# Patient Record
Sex: MALE | Race: WHITE | NOT HISPANIC OR LATINO | Employment: OTHER | URBAN - METROPOLITAN AREA
[De-identification: names, ages, dates, MRNs, and addresses within clinical notes are randomized per-mention and may not be internally consistent; named-entity substitution may affect disease eponyms.]

---

## 2017-06-13 ENCOUNTER — OFFICE VISIT (OUTPATIENT)
Dept: OPTOMETRY | Facility: CLINIC | Age: 67
End: 2017-06-13
Payer: COMMERCIAL

## 2017-06-13 DIAGNOSIS — H52.203 ASTIGMATISM, BILATERAL: ICD-10-CM

## 2017-06-13 DIAGNOSIS — H52.03 HYPEROPIA, BILATERAL: Primary | ICD-10-CM

## 2017-06-13 DIAGNOSIS — H52.4 PRESBYOPIA: ICD-10-CM

## 2017-06-13 DIAGNOSIS — H26.9 CATARACTS, BOTH EYES: ICD-10-CM

## 2017-06-13 PROCEDURE — 92014 COMPRE OPH EXAM EST PT 1/>: CPT | Performed by: OPTOMETRIST

## 2017-06-13 PROCEDURE — 92015 DETERMINE REFRACTIVE STATE: CPT | Performed by: OPTOMETRIST

## 2017-06-13 ASSESSMENT — VISUAL ACUITY
METHOD: SNELLEN - LINEAR
OD_SC: 20/100
OS_SC: 20/60
OS_CC: 20/30
OS_CC+: -2
OD_CC: 20/80
OS_CC: 20/40
OD_CC: 20/80-1
CORRECTION_TYPE: GLASSES
OD_CC+: -1
OD_PH_SC: 20/40
OS_SC+: -1

## 2017-06-13 ASSESSMENT — TONOMETRY
OD_IOP_MMHG: 17
OS_IOP_MMHG: 14
IOP_METHOD: TONOPEN

## 2017-06-13 ASSESSMENT — REFRACTION_MANIFEST
OS_AXIS: 005
OD_SPHERE: +1.00
OD_CYLINDER: +0.75
OD_ADD: +2.75
OS_SPHERE: +1.25
OS_ADD: +2.75
OD_AXIS: 180
OS_CYLINDER: +0.75

## 2017-06-13 ASSESSMENT — REFRACTION_WEARINGRX
OD_AXIS: 015
OS_ADD: +2.75
OS_SPHERE: +1.00
OD_AXIS: 015
OD_CYLINDER: +0.50
OS_ADD: +2.75
OD_CYLINDER: +0.50
OD_SPHERE: +1.00
OS_ADD: +2.75
OD_ADD: +2.75
OD_SPHERE: +1.00
OD_ADD: +2.75
OD_CYLINDER: +1.00
OS_AXIS: 005
SPECS_TYPE: PAL
OD_AXIS: 015
OS_SPHERE: +1.25
OS_ADD: +2.75
OS_CYLINDER: +0.75
OS_AXIS: 005
OS_CYLINDER: +0.75
OS_CYLINDER: +0.75
OS_AXIS: 005
OD_ADD: +2.75
OS_AXIS: 015
OS_SPHERE: +1.00
OD_CYLINDER: +0.50
OS_SPHERE: +1.25
OD_SPHERE: +1.00
OD_AXIS: 015
OD_ADD: +2.75
OD_SPHERE: +1.25
OS_CYLINDER: +0.75

## 2017-06-13 ASSESSMENT — SLIT LAMP EXAM - LIDS
COMMENTS: 1+ DERMATOCHALASIS - UPPER LID
COMMENTS: 1+ DERMATOCHALASIS - UPPER LID

## 2017-06-13 ASSESSMENT — CONF VISUAL FIELD
OD_NORMAL: 1
METHOD: COUNTING FINGERS
OS_NORMAL: 1

## 2017-06-13 ASSESSMENT — EXTERNAL EXAM - LEFT EYE: OS_EXAM: NORMAL

## 2017-06-13 ASSESSMENT — CUP TO DISC RATIO
OS_RATIO: 0.15
OD_RATIO: 0.15

## 2017-06-13 ASSESSMENT — EXTERNAL EXAM - RIGHT EYE: OD_EXAM: NORMAL

## 2017-06-13 NOTE — MR AVS SNAPSHOT
"              After Visit Summary   6/13/2017    Jhonatan Crocker    MRN: [id number]           Patient Information     Date Of Birth          1950        Visit Information        Provider Department      6/13/2017 9:40 AM Crow Huerta, OD [facility]        Today's Diagnoses     Hyperopia, bilateral    -  1    Astigmatism, bilateral        Presbyopia        Cataracts, both eyes          Care Instructions    Eyeglass prescription given.    You have cataracts which are affecting your vision.  A change in glasses will not give you much improvement.  A cataract evaluation can be scheduled with the eye surgeon to discuss with you the option of cataract surgery.  (patient declines)- wants to monitor.     Return in 1 year for a complete eye exam or sooner if needed.    Crow Huerta OD            Follow-ups after your visit        Follow-up notes from your care team     Return in about 1 year (around 6/13/2018) for Annual Visit.      Who to contact     If you have questions or need follow up information about today's clinic visit or your schedule please contact [facility] directly at [phone].  Normal or non-critical lab and imaging results will be communicated to you by Aracahart, letter or phone within 4 business days after the clinic has received the results. If you do not hear from us within 7 days, please contact the clinic through Kitara Mediat or phone. If you have a critical or abnormal lab result, we will notify you by phone as soon as possible.  Submit refill requests through CaseTrek or call your pharmacy and they will forward the refill request to us. Please allow 3 business days for your refill to be completed.          Additional Information About Your Visit        Aracahart Information     CaseTrek lets you send messages to your doctor, view your test results, renew your prescriptions, schedule appointments and more. To sign up, go to [email or web address] . Click on \"Log " "in\" on the left side of the screen, which will take you to the Welcome page. Then click on \"Sign up Now\" on the right side of the page.     You will be asked to enter the access code listed below, as well as some personal information. Please follow the directions to create your username and password.     Your access code is: [email or web address]  Expires: 2017 11:04 AM     Your access code will  in 90 days. If you need help or a new code, please call your [facility] or [phone].        Care EveryWhere ID     This is your Care EveryWhere ID. This could be used by other organizations to access your [employer] medical records  [id number]         Blood Pressure from Last 3 Encounters:   No data found for BP    Weight from Last 3 Encounters:   No data found for Wt              We Performed the Following     EYE EXAM (SIMPLE-NONBILLABLE)     REFRACTION        Primary Care Provider Office Phone # Fax #    Randy Allred [phone]       [address] [address]        Thank you!     Thank you for choosing [facility]  for your care. Our goal is always to provide you with excellent care. Hearing back from our patients is one way we can continue to improve our services. Please take a few minutes to complete the written survey that you may receive in the mail after your visit with us. Thank you!             Your Updated Medication List - Protect others around you: Learn how to safely use, store and throw away your medicines at www.disposemymeds.org.          This list is accurate as of: 17 11:04 AM.  Always use your most recent med list.                   Brand Name Dispense Instructions for use    aspirin 325 MG tablet      Take by mouth daily       ICAPS MV PO            "

## 2017-06-13 NOTE — PROGRESS NOTES
Chief Complaint   Patient presents with     COMPREHENSIVE EYE EXAM      Accompanied by wife  Last Eye Exam: 5-  Dilated Previously: Yes    What are you currently using to see?  glasses       Distance Vision Acuity: Satisfied with vision    Near Vision Acuity: Satisfied with vision while reading  with glasses    Eye Comfort: good  Do you use eye drops? : No  Occupation or Hobbies: retired    Cherie Mederos Optometric Assistant, A.B.O.C.          Medical, surgical and family histories reviewed and updated 6/13/2017.       OBJECTIVE: See Ophthalmology exam    ASSESSMENT:    ICD-10-CM    1. Hyperopia, bilateral H52.03 REFRACTION   2. Astigmatism, bilateral H52.203 REFRACTION   3. Presbyopia H52.4 REFRACTION   4. Cataracts, both eyes H26.9 EYE EXAM (SIMPLE-NONBILLABLE)      PLAN:     Patient Instructions   Eyeglass prescription given.    You have cataracts which are affecting your vision.  A change in glasses will not give you much improvement.  A cataract evaluation can be scheduled with the eye surgeon to discuss with you the option of cataract surgery.  (patient declines)- wants to monitor.     Return in 1 year for a complete eye exam or sooner if needed.    Crow Huerta, OD

## 2017-06-13 NOTE — PATIENT INSTRUCTIONS
Eyeglass prescription given.    You have cataracts which are affecting your vision.  A change in glasses will not give you much improvement.  A cataract evaluation can be scheduled with the eye surgeon to discuss with you the option of cataract surgery.  (patient declines)- wants to monitor.     Return in 1 year for a complete eye exam or sooner if needed.    Crow Huerta, OD

## 2018-06-20 ENCOUNTER — OFFICE VISIT (OUTPATIENT)
Dept: OPTOMETRY | Facility: CLINIC | Age: 68
End: 2018-06-20
Payer: COMMERCIAL

## 2018-06-20 DIAGNOSIS — H25.813 COMBINED FORMS OF AGE-RELATED CATARACT OF BOTH EYES: ICD-10-CM

## 2018-06-20 DIAGNOSIS — H52.4 PRESBYOPIA: Primary | ICD-10-CM

## 2018-06-20 DIAGNOSIS — H52.223 REGULAR ASTIGMATISM OF BOTH EYES: ICD-10-CM

## 2018-06-20 DIAGNOSIS — H52.03 HYPERMETROPIA OF BOTH EYES: ICD-10-CM

## 2018-06-20 PROCEDURE — 92014 COMPRE OPH EXAM EST PT 1/>: CPT | Performed by: OPTOMETRIST

## 2018-06-20 PROCEDURE — 92015 DETERMINE REFRACTIVE STATE: CPT | Performed by: OPTOMETRIST

## 2018-06-20 ASSESSMENT — REFRACTION_WEARINGRX
OS_AXIS: 015
OD_SPHERE: +1.00
OS_SPHERE: +1.00
SPECS_TYPE: AUTO/SVL
OD_AXIS: 014
OD_CYLINDER: +0.50
OD_CYLINDER: +0.75
OS_CYLINDER: +0.50
OS_SPHERE: +1.00
OD_SPHERE: +1.00
OS_ADD: +2.75
OD_ADD: +2.75
OD_AXIS: 011
OS_AXIS: 003
OS_CYLINDER: +0.50
SPECS_TYPE: AUTO/PAL

## 2018-06-20 ASSESSMENT — CUP TO DISC RATIO
OS_RATIO: 0.15
OD_RATIO: 0.15

## 2018-06-20 ASSESSMENT — VISUAL ACUITY
OD_PH_SC: 20/40-2
OS_SC+: -2
OD_CC: 20/200
CORRECTION_TYPE: GLASSES
OS_SC: 20/30
OD_CC: 20/150
OS_CC: 20/40
OS_CC+: -2
METHOD_MR_RETINOSCOPY: 1
METHOD: SNELLEN - LINEAR
OD_SC: 20/150
OS_CC: 20/20

## 2018-06-20 ASSESSMENT — TONOMETRY
OS_IOP_MMHG: 19
OD_IOP_MMHG: 19
IOP_METHOD: TONOPEN

## 2018-06-20 ASSESSMENT — SLIT LAMP EXAM - LIDS
COMMENTS: 1+ DERMATOCHALASIS - UPPER LID
COMMENTS: 1+ DERMATOCHALASIS - UPPER LID

## 2018-06-20 ASSESSMENT — EXTERNAL EXAM - LEFT EYE: OS_EXAM: NORMAL

## 2018-06-20 ASSESSMENT — REFRACTION_MANIFEST
OS_SPHERE: +1.50
OS_AXIS: 015
OD_AXIS: 011
OD_SPHERE: +0.50
OD_CYLINDER: +1.50
OS_CYLINDER: +0.50

## 2018-06-20 ASSESSMENT — EXTERNAL EXAM - RIGHT EYE: OD_EXAM: NORMAL

## 2018-06-20 ASSESSMENT — CONF VISUAL FIELD
OD_NORMAL: 1
OS_NORMAL: 1

## 2018-06-20 NOTE — PROGRESS NOTES
Chief Complaint   Patient presents with     COMPREHENSIVE EYE EXAM      Accompanied by wife  Last Eye Exam: 6-  Dilated Previously: Yes    What are you currently using to see?  glasses       Distance Vision Acuity: Noticed gradual change in right eye    Near Vision Acuity: Satisfied with vision while reading  with glasses    Eye Comfort: good  Do you use eye drops? : No  Occupation or Hobbies: retired    Cherie Mederos Optometric Assistant, A.B.O.C.          Medical, surgical and family histories reviewed and updated 6/20/2018.       OBJECTIVE: See Ophthalmology exam    ASSESSMENT:    ICD-10-CM    1. Presbyopia H52.4 REFRACTION   2. Hypermetropia of both eyes H52.03 REFRACTION   3. Regular astigmatism of both eyes H52.223 REFRACTION   4. Combined forms of age-related cataract of both eyes H25.813 EYE EXAM (SIMPLE-NONBILLABLE)     OPHTHALMOLOGY ADULT REFERRAL      PLAN:     Patient Instructions   Referral to Dr. Melendez at [facility] for cataract evaluation.    Crow Huerta, OD

## 2018-06-20 NOTE — MR AVS SNAPSHOT
After Visit Summary   6/20/2018    Jhonatan Crocker    MRN: [id number]           Patient Information     Date Of Birth          1950        Visit Information        Provider Department      6/20/2018 9:00 AM Crow Huerta OD [facility]        Today's Diagnoses     Presbyopia    -  1    Hypermetropia of both eyes        Regular astigmatism of both eyes        Combined forms of age-related cataract of both eyes          Care Instructions    Referral to Dr. Melendez at [facility] for cataract evaluation.    Crow Huerta, EZEKIEL      The affects of the dilating drops last for 4- 6 hours.  You will be more sensitive to light and vision will be blurry up close.  Mydriatic sunglasses were given if needed.      Optometry Providers       Clinic Locations                                 Telephone Number   Dr. Salina Miranda [facility]   [place] [phone]     [place] [facility] Hours:                [place] [facility] Hours:       [place] [facility] Hours:   [address]   [address]     [address] [place] [address]   [place] [address]    [place] [address]  Phone: [phone]                    Phone: [phone]     Phone: [phone]                      Monday 8:00-7:00                          Monday 8:00-7:00                          Monday 8:00-7:00              Tuesday 8:00-6:00                          Tuesday 8:00-7:00                          Tuesday 8:00-7:00              Wednesday 8:00-6:00                  Wednesday 8:00-7:00                   Wednesday 8:00-7:00      Thursday 8:00-6:00                        Thursday 8:00-7:00                         Thursday 8:00-7:00            Friday 8:00-5:00                              Friday 8:00-5:00                              Friday 8:00-5:00    [place] [facility] Hours:    [address] [place] [address]  [phone]    Monday 8:00-7:00  Tuesday 8:00-7:00  Wednesday 8:00-7:00  Thursday 8:00-7:00  Friday 8:00-5:00  Please log on to [email or web address] to order your contact lenses.  The link is found on the Eye Care and Vision Services page.  As always, Thank you for trusting us with your health care needs!            Follow-ups after your visit        Additional Services     OPHTHALMOLOGY ADULT REFERRAL       Your provider has referred you to:  [facility] [phone]   [email or web address]      Please be aware that coverage of these services is subject to the terms and limitations of your health insurance plan.  Call member services at your health plan with any benefit or coverage questions.      Please bring the following to your appointment:  >>   Any x-rays, CTs or MRIs which have been performed.  Contact the facility where they were done to arrange for  prior to your scheduled appointment.  Any new CT, MRI or other procedures ordered by your specialist must be performed at a [place] facility or coordinated by your clinic's referral office.    >>   List of current medications   >>   This referral request   >>   Any documents/labs given to you for this referral                  Follow-up notes from your care team     Return in about 1 year (around 6/20/2019) for Annual Visit.      Your next 10 appointments already scheduled     Jun 25, 2018 10:00 AM CDT   New Visit with Jaden [facility]endez MD   [facility]    [address] [place] [address] [phone]              Who to contact     If you have questions or need follow up information about today's clinic visit or your schedule please contact [facility] directly at [phone].  Normal or non-critical lab and imaging results will be  communicated to you by MyChart, letter or phone within 4 business days after the clinic has received the results. If you do not hear from us within 7 days, please contact the clinic through MyChart or phone. If you have a critical or abnormal lab result, we will notify you by phone as soon as possible.  Submit refill requests through AdmitOne Securityhart or call your pharmacy and they will forward the refill request to us. Please allow 3 business days for your refill to be completed.          Additional Information About Your Visit        Care EveryWhere ID     This is your Care EveryWhere ID. This could be used by other organizations to access your [place] medical records  [id number]         Blood Pressure from Last 3 Encounters:   No data found for BP    Weight from Last 3 Encounters:   No data found for Wt              We Performed the Following     EYE EXAM (SIMPLE-NONBILLABLE)     OPHTHALMOLOGY ADULT REFERRAL     REFRACTION        Primary Care Provider Office Phone # Fax #    Randy Casey Allred [phone]       [address] [place], [place]. \[facility] and Outlook.\"" [address]        Equal Access to Services     ABA [facility] HospitalROXANNA : Hadii aad ku hadasho Soomaali, waaxda luqadaha, qaybta kaalmada adeegyada, waxay idiin hayaan adeeg kharagus la'aileen . So [facility] [phone].    ATENCIÓN: Si habla español, tiene a oliver disposición servicios gratuitos de asistencia lingüística. [facility] [phone].    We comply with applicable federal civil rights laws and [place] laws. We do not discriminate on the basis of race, color, national origin, age, disability, sex, sexual orientation, or gender identity.            Thank you!     Thank you for choosing [facility]  for your care. Our goal is always to provide you with excellent care. Hearing back from our patients is one way we can continue to improve our services. Please take a few minutes to complete the written survey that you may receive in the mail after your visit  with us. Thank you!             Your Updated Medication List - Protect others around you: Learn how to safely use, store and throw away your medicines at www.disposemymeds.org.          This list is accurate as of 6/20/18 10:02 AM.  Always use your most recent med list.                   Brand Name Dispense Instructions for use Diagnosis    aspirin 325 MG tablet      Take by mouth daily        ICAPS MV PO

## 2018-06-20 NOTE — PATIENT INSTRUCTIONS
Referral to Dr. Melendez at [facility] for cataract evaluation.    Crow Huerta, EZEKIEL      The affects of the dilating drops last for 4- 6 hours.  You will be more sensitive to light and vision will be blurry up close.  Mydriatic sunglasses were given if needed.      Optometry Providers       Clinic Locations                                 Telephone Number   Dr. Salina Miranda [facility]   [place] [phone]     [place] [facility] Hours:                [place] [facility] Hours:       [place] [facility] Hours:   [address]   [address]     [address]  [place] [address]   [place] [address]    [place] [address]  Phone: [phone]                    Phone: [phone]     Phone: [phone]                      Monday 8:00-7:00                          Monday 8:00-7:00                          Monday 8:00-7:00              Tuesday 8:00-6:00                          Tuesday 8:00-7:00                          Tuesday 8:00-7:00              Wednesday 8:00-6:00                  Wednesday 8:00-7:00                   Wednesday 8:00-7:00      Thursday 8:00-6:00                        Thursday 8:00-7:00                         Thursday 8:00-7:00            Friday 8:00-5:00                              Friday 8:00-5:00                              Friday 8:00-5:00    [place] [facility] Hours:   [address] [place] [address]  [phone]    Monday 8:00-7:00  Tuesday 8:00-7:00  Wednesday 8:00-7:00  Thursday 8:00-7:00  Friday 8:00-5:00  Please log on to [email or web address] to order your contact lenses.  The link is found on the Eye Care and Vision Services page.  As always, Thank you for trusting us with your health care needs!

## 2018-06-25 ENCOUNTER — OFFICE VISIT (OUTPATIENT)
Dept: OPHTHALMOLOGY | Facility: CLINIC | Age: 68
End: 2018-06-25
Payer: COMMERCIAL

## 2018-06-25 ENCOUNTER — TELEPHONE (OUTPATIENT)
Dept: OPHTHALMOLOGY | Facility: CLINIC | Age: 68
End: 2018-06-25

## 2018-06-25 DIAGNOSIS — H25.813 COMBINED FORMS OF AGE-RELATED CATARACT OF BOTH EYES: Primary | ICD-10-CM

## 2018-06-25 DIAGNOSIS — H26.9 CATARACTS, BOTH EYES: Primary | ICD-10-CM

## 2018-06-25 PROCEDURE — 92136 OPHTHALMIC BIOMETRY: CPT | Performed by: OPHTHALMOLOGY

## 2018-06-25 PROCEDURE — 92014 COMPRE OPH EXAM EST PT 1/>: CPT | Performed by: OPHTHALMOLOGY

## 2018-06-25 ASSESSMENT — VISUAL ACUITY
OD_PH_CC: 20/50-
METHOD: SNELLEN - LINEAR
OS_CC: 20/20
OD_CC: 20/300

## 2018-06-25 ASSESSMENT — REFRACTION_WEARINGRX
OD_CYLINDER: +0.75
OS_SPHERE: +1.00
OS_AXIS: 003
OD_AXIS: 014
OS_CYLINDER: +0.50
SPECS_TYPE: AUTO/PAL
OS_ADD: +2.75
OD_CYLINDER: +0.50
OD_SPHERE: +1.00
OS_CYLINDER: +0.50
OS_SPHERE: +1.00
OS_AXIS: 015
OD_AXIS: 011
OD_ADD: +2.75
SPECS_TYPE: AUTO/SVL
OD_SPHERE: +1.00

## 2018-06-25 ASSESSMENT — PACHYMETRY
OS_CT(UM): 586
OD_CT(UM): 576

## 2018-06-25 ASSESSMENT — EXTERNAL EXAM - RIGHT EYE: OD_EXAM: NORMAL

## 2018-06-25 ASSESSMENT — CUP TO DISC RATIO
OD_RATIO: 0.15
OS_RATIO: 0.15

## 2018-06-25 ASSESSMENT — TONOMETRY
OD_IOP_MMHG: 18
OS_IOP_MMHG: 20
IOP_METHOD: TONOPEN

## 2018-06-25 ASSESSMENT — SLIT LAMP EXAM - LIDS
COMMENTS: 1+ DERMATOCHALASIS - UPPER LID
COMMENTS: 1+ DERMATOCHALASIS - UPPER LID

## 2018-06-25 ASSESSMENT — CONF VISUAL FIELD
OD_NORMAL: 1
OS_NORMAL: 1

## 2018-06-25 ASSESSMENT — EXTERNAL EXAM - LEFT EYE: OS_EXAM: NORMAL

## 2018-06-25 NOTE — PROGRESS NOTES
Assessment & Plan   Jhonatan Crocker is a 68 year old male who presents with:   Review of systems for the eyes was negative other than the pertinent positives and negatives noted in the HPI.  History is obtained from the patient.    Chief Complaint   Patient presents with     Cataract Evaluation     Referred by Dr Huerta.       Combined forms of age-related cataract of both eyes  - visually significant right eye greater than left eye.  - A's and K's today - results reviewed and appropriate lenses chosen, see scanned documentation.  - Schedule surgery - at [facility]   Discussed lens options. Standard lens monofocal vs monovision. Multifocal vs Toric     Risks, benefits, an alternatives of intended procedure discussed. He wishes to proceed.    Pt would like to proceed with surgery starting with the right eye using a standard lens setting both eyes for distance.    He understands that he may still need to wear glasses after surgery.    - OPHTHALMIC BIOMETY W IOL POWER CALC    Return  for surgery    Documentation for today's encounter was performed by Corin Black COA. OSC. Acting as a scribe in my presence. I have reviewed and verified that it is an accurate recording of today's encounter.    Attending Physician Attestation:  Complete documentation of historical and exam elements from today's encounter can be found in the full encounter summary report (not reduplicated in this progress note).  I personally obtained the chief complaint(s) and history of present illness.  I confirmed and edited as necessary the review of systems, past medical/surgical history, family history, social history, and examination findings as documented by others; and I examined the patient myself.  I personally reviewed the relevant tests, images, and reports as documented above.  I formulated and edited as necessary the assessment and plan and discussed the findings and management plan with the patient and family. -  Jaden Melendez MD

## 2018-06-25 NOTE — MR AVS SNAPSHOT
After Visit Summary   6/25/2018    Jhonatan Crocker    MRN: [id number]           Patient Information     Date Of Birth          1950        Visit Information        Provider Department      6/25/2018 10:00 AM Jaden Melendez MD [facility]        Today's Diagnoses     Combined forms of age-related cataract of both eyes    -  1       Follow-ups after your visit        Follow-up notes from your care team     Return for Surgery.      Who to contact     If you have questions or need follow up information about today's clinic visit or your schedule please contact [facility] directly at [phone].  Normal or non-critical lab and imaging results will be communicated to you by MyChart, letter or phone within 4 business days after the clinic has received the results. If you do not hear from us within 7 days, please contact the clinic through MyChart or phone. If you have a critical or abnormal lab result, we will notify you by phone as soon as possible.  Submit refill requests through Orthobond or call your pharmacy and they will forward the refill request to us. Please allow 3 business days for your refill to be completed.          Additional Information About Your Visit        Care EveryWhere ID     This is your Care EveryWhere ID. This could be used by other organizations to access your [place] medical records  [id number]         Blood Pressure from Last 3 Encounters:   No data found for BP    Weight from Last 3 Encounters:   No data found for Wt              We Performed the Following     EYE EXAM, NEW PATIENT,COMPREHESV     OPHTHALMIC BIOMETY W IOL POWER CALC        Primary Care Provider Office Phone # Fax #    Randy Allred [phone]       [address] [facility] [address]        Equal Access to Services     ADILENE HURTADO : Hadii dc Haney, waraquel booth, qasara sanchez  dillon quigleykeenangus jacksonLanaacorby ah. So [facility] [phone].    ATENCIÓN: Si habla rosario, tiene a oliver disposición servicios gratuitos de asistencia lingüística. Jesús al [phone].    We comply with applicable federal civil rights laws and [place] laws. We do not discriminate on the basis of race, color, national origin, age, disability, sex, sexual orientation, or gender identity.            Thank you!     Thank you for choosing [facility]  for your care. Our goal is always to provide you with excellent care. Hearing back from our patients is one way we can continue to improve our services. Please take a few minutes to complete the written survey that you may receive in the mail after your visit with us. Thank you!             Your Updated Medication List - Protect others around you: Learn how to safely use, store and throw away your medicines at www.disposemymeds.org.          This list is accurate as of 6/25/18  3:36 PM.  Always use your most recent med list.                   Brand Name Dispense Instructions for use Diagnosis    aspirin 325 MG tablet      Take by mouth daily        ICAPS MV PO

## 2018-06-25 NOTE — TELEPHONE ENCOUNTER
Procedure: right Cataract  Facility: [facility]  Length: 0.5 hour(s)  Surgeon:Castro Melendez MD  Anesthesia: Local with MAC  Diagnosis: Cataract  Out Patient or AM admit:  (Same day)  BMI:There is no height or weight on file to calculate BMI. (If over 43 for general or 45 for MAC cannot be scheduled at [facility])   Pre-op Appointments needed: History & Physical within 30 days of surgery  Post-op appointments needed: Cataract 1 day 1 week   needed:No   Surgery packet/instructions given to patient?:  Yes  Pre op teaching done:  Yes  Lens Orders Needed: Yes: ZCB00 20.5, with incision at  135    Referring provider: Dr. Huerta  Special Considerations:     Procedure: left Cataract  Facility: [facility]  Length: 0.5 hour(s)  Surgeon:Castro Melendez MD  Anesthesia: Local with MAC  Diagnosis: Cataract  Out Patient or AM admit:  (Same day)  BMI:There is no height or weight on file to calculate BMI. (If over 43 for general or 45 for MAC cannot be scheduled at [facility])   Pre-op Appointments needed: History & Physical within 30 days of surgery  Post-op appointments needed: Cataract 1 day 1 week 4 weeks   needed:No   Surgery packet/instructions given to patient?:  Yes  Pre op teaching done:  Yes  Lens Orders Needed: Yes: ZCB00 20.5, with incision at  90    Referring provider: Dr. Huerta  Special Considerations:

## 2018-06-25 NOTE — NURSING NOTE
Patient presents with:  Cataract Evaluation: Referred by Dr Huerta.      [place]  [address] [place] [address]    HPI    Last Eye Exam:  6/20/18   Additional Referring Providers:  Dr. Huerta   Informant(s):  EMR   Affected eye(s):  Right   Symptoms:     Blurred vision   Decreased vision   Difficulty with reading   Difficulty watching television      Duration:  1 year   Frequency:  Daily       Do you have eye pain now?:  No      Comments:  VA OD is very blurry.  Pt denies any gtts.             Linda Ferrari COT

## 2018-06-26 ENCOUNTER — HOSPITAL ENCOUNTER (OUTPATIENT)
Facility: AMBULATORY SURGERY CENTER | Age: 68
End: 2018-06-26
Attending: OPHTHALMOLOGY | Admitting: OPHTHALMOLOGY
Payer: COMMERCIAL

## 2018-06-28 NOTE — TELEPHONE ENCOUNTER
Date Scheduled: 8-24 and 9-27  Facility: [facility]  Surgeon: Dr. Melendez   Post-op appointment scheduled:   Next 5 appointments (look out 90 days)     Aug 24, 2018 11:00 AM CDT   [facility] Crow [facility]erta OD   [facility]    [address] [place] [id number] [phone]            Aug 29, 2018 11:20 AM CDT   Return Visit with Crow Huerta OD   [facility]    [address] [place] [id number] [phone]                   scheduled?: No  Surgery packet/instructions confirmed received?  Yes  Special Considerations:   Sammie Jesus, Surgery Scheduling Coordinator

## 2018-08-15 RX ORDER — PREDNISOLONE ACETATE 10 MG/ML
1 SUSPENSION/ DROPS OPHTHALMIC 4 TIMES DAILY
Qty: 1 BOTTLE | Refills: 1 | Status: SHIPPED | OUTPATIENT
Start: 2018-08-15 | End: 2018-10-05

## 2018-08-15 RX ORDER — KETOROLAC TROMETHAMINE 5 MG/ML
1 SOLUTION OPHTHALMIC 4 TIMES DAILY
Qty: 1 BOTTLE | Refills: 1 | Status: SHIPPED | OUTPATIENT
Start: 2018-08-15 | End: 2018-10-05

## 2018-08-15 RX ORDER — OFLOXACIN 3 MG/ML
1 SOLUTION/ DROPS OPHTHALMIC 4 TIMES DAILY
Qty: 1 BOTTLE | Refills: 1 | Status: SHIPPED | OUTPATIENT
Start: 2018-08-15 | End: 2018-08-25

## 2018-08-21 RX ORDER — PHENYLEPHRINE HYDROCHLORIDE 25 MG/ML
1 SOLUTION/ DROPS OPHTHALMIC
Status: CANCELLED | OUTPATIENT
Start: 2018-08-23

## 2018-08-21 RX ORDER — CYCLOPENTOLATE HYDROCHLORIDE 10 MG/ML
1 SOLUTION/ DROPS OPHTHALMIC
Status: CANCELLED | OUTPATIENT
Start: 2018-08-23

## 2018-08-21 RX ORDER — FLURBIPROFEN SODIUM 0.3 MG/ML
1 SOLUTION/ DROPS OPHTHALMIC
Status: CANCELLED | OUTPATIENT
Start: 2018-08-23

## 2018-08-21 RX ORDER — MOXIFLOXACIN 5 MG/ML
1 SOLUTION/ DROPS OPHTHALMIC
Status: CANCELLED | OUTPATIENT
Start: 2018-08-23

## 2018-08-21 RX ORDER — PROPARACAINE HYDROCHLORIDE 5 MG/ML
1 SOLUTION/ DROPS OPHTHALMIC
Status: CANCELLED | OUTPATIENT
Start: 2018-08-23

## 2018-08-21 NOTE — TELEPHONE ENCOUNTER
The patient called to cancel his surgery date with Dr. Melendez. He stated that something has come up on his schedule and he will need to cancel for the time being. He would still like to hold onto the 2nd surgery date of 9-27. He will call back to reschedule if needed.  Sammie Jesus, Surgery Scheduling Coordinator

## 2018-09-24 NOTE — TELEPHONE ENCOUNTER
Phone call to pt reminding him to start eye drops in the right eye in preparation for cataract surgery on Thursday. Pt verbalized understanding, states he has no further questions at this time.  Arline Gonzalez RN

## 2018-09-26 ENCOUNTER — ANESTHESIA EVENT (OUTPATIENT)
Dept: SURGERY | Facility: AMBULATORY SURGERY CENTER | Age: 68
End: 2018-09-26

## 2018-09-27 ENCOUNTER — HOSPITAL ENCOUNTER (OUTPATIENT)
Facility: AMBULATORY SURGERY CENTER | Age: 68
Discharge: HOME | End: 2018-09-27
Attending: OPHTHALMOLOGY | Admitting: OPHTHALMOLOGY
Payer: COMMERCIAL

## 2018-09-27 ENCOUNTER — SURGERY (OUTPATIENT)
Age: 68
End: 2018-09-27
Payer: COMMERCIAL

## 2018-09-27 ENCOUNTER — ANESTHESIA (OUTPATIENT)
Dept: SURGERY | Facility: AMBULATORY SURGERY CENTER | Age: 68
End: 2018-09-27
Payer: COMMERCIAL

## 2018-09-27 VITALS
WEIGHT: 202.16 LBS | DIASTOLIC BLOOD PRESSURE: 77 MMHG | OXYGEN SATURATION: 98 % | SYSTOLIC BLOOD PRESSURE: 140 MMHG | RESPIRATION RATE: 16 BRPM | TEMPERATURE: 98 F

## 2018-09-27 PROCEDURE — 66984 XCAPSL CTRC RMVL W/O ECP: CPT | Mod: RT

## 2018-09-27 PROCEDURE — G8918 PT W/O PREOP ORDER IV AB PRO: HCPCS

## 2018-09-27 PROCEDURE — G8907 PT DOC NO EVENTS ON DISCHARG: HCPCS

## 2018-09-27 PROCEDURE — 66984 XCAPSL CTRC RMVL W/O ECP: CPT | Mod: RT | Performed by: OPHTHALMOLOGY

## 2018-09-27 DEVICE — EYE IMP IOL AMO PCL TECNIS ZCB00 20.5: Type: IMPLANTABLE DEVICE | Site: EYE | Status: FUNCTIONAL

## 2018-09-27 RX ORDER — TETRACAINE HYDROCHLORIDE 5 MG/ML
SOLUTION OPHTHALMIC PRN
Status: DISCONTINUED | OUTPATIENT
Start: 2018-09-27 | End: 2018-09-27 | Stop reason: HOSPADM

## 2018-09-27 RX ORDER — KETOROLAC TROMETHAMINE 5 MG/ML
1 SOLUTION OPHTHALMIC
Status: COMPLETED | OUTPATIENT
Start: 2018-09-27 | End: 2018-09-27

## 2018-09-27 RX ORDER — DEXAMETHASONE SODIUM PHOSPHATE 4 MG/ML
4 INJECTION, SOLUTION INTRA-ARTICULAR; INTRALESIONAL; INTRAMUSCULAR; INTRAVENOUS; SOFT TISSUE EVERY 10 MIN PRN
Status: DISCONTINUED | OUTPATIENT
Start: 2018-09-27 | End: 2018-09-28 | Stop reason: HOSPADM

## 2018-09-27 RX ORDER — ONDANSETRON 2 MG/ML
4 INJECTION INTRAMUSCULAR; INTRAVENOUS EVERY 30 MIN PRN
Status: DISCONTINUED | OUTPATIENT
Start: 2018-09-27 | End: 2018-09-28 | Stop reason: HOSPADM

## 2018-09-27 RX ORDER — ACETAMINOPHEN 325 MG/1
975 TABLET ORAL ONCE
Status: COMPLETED | OUTPATIENT
Start: 2018-09-27 | End: 2018-09-27

## 2018-09-27 RX ORDER — HYDROMORPHONE HYDROCHLORIDE 1 MG/ML
.3-.5 INJECTION, SOLUTION INTRAMUSCULAR; INTRAVENOUS; SUBCUTANEOUS EVERY 10 MIN PRN
Status: DISCONTINUED | OUTPATIENT
Start: 2018-09-27 | End: 2018-09-28 | Stop reason: HOSPADM

## 2018-09-27 RX ORDER — LIDOCAINE 40 MG/G
CREAM TOPICAL
Status: DISCONTINUED | OUTPATIENT
Start: 2018-09-27 | End: 2018-09-28 | Stop reason: HOSPADM

## 2018-09-27 RX ORDER — ONDANSETRON 4 MG/1
4 TABLET, ORALLY DISINTEGRATING ORAL EVERY 30 MIN PRN
Status: DISCONTINUED | OUTPATIENT
Start: 2018-09-27 | End: 2018-09-28 | Stop reason: HOSPADM

## 2018-09-27 RX ORDER — CYCLOPENTOLATE HYDROCHLORIDE 10 MG/ML
1 SOLUTION/ DROPS OPHTHALMIC
Status: COMPLETED | OUTPATIENT
Start: 2018-09-27 | End: 2018-09-27

## 2018-09-27 RX ORDER — PROPARACAINE HYDROCHLORIDE 5 MG/ML
1 SOLUTION/ DROPS OPHTHALMIC
Status: DISCONTINUED | OUTPATIENT
Start: 2018-09-27 | End: 2018-09-28 | Stop reason: HOSPADM

## 2018-09-27 RX ORDER — FENTANYL CITRATE 50 UG/ML
25-50 INJECTION, SOLUTION INTRAMUSCULAR; INTRAVENOUS
Status: DISCONTINUED | OUTPATIENT
Start: 2018-09-27 | End: 2018-09-28 | Stop reason: HOSPADM

## 2018-09-27 RX ORDER — MOXIFLOXACIN 5 MG/ML
1 SOLUTION/ DROPS OPHTHALMIC
Status: DISCONTINUED | OUTPATIENT
Start: 2018-09-27 | End: 2018-09-28 | Stop reason: HOSPADM

## 2018-09-27 RX ORDER — SODIUM CHLORIDE, SODIUM LACTATE, POTASSIUM CHLORIDE, CALCIUM CHLORIDE 600; 310; 30; 20 MG/100ML; MG/100ML; MG/100ML; MG/100ML
INJECTION, SOLUTION INTRAVENOUS CONTINUOUS
Status: DISCONTINUED | OUTPATIENT
Start: 2018-09-27 | End: 2018-09-28 | Stop reason: HOSPADM

## 2018-09-27 RX ORDER — PHENYLEPHRINE HYDROCHLORIDE 25 MG/ML
1 SOLUTION/ DROPS OPHTHALMIC
Status: COMPLETED | OUTPATIENT
Start: 2018-09-27 | End: 2018-09-27

## 2018-09-27 RX ORDER — PHENYLEPHRINE HYDROCHLORIDE 25 MG/ML
1 SOLUTION/ DROPS OPHTHALMIC
Status: DISCONTINUED | OUTPATIENT
Start: 2018-09-27 | End: 2018-09-28 | Stop reason: HOSPADM

## 2018-09-27 RX ORDER — FLURBIPROFEN SODIUM 0.3 MG/ML
1 SOLUTION/ DROPS OPHTHALMIC
Status: DISCONTINUED | OUTPATIENT
Start: 2018-09-27 | End: 2018-09-28 | Stop reason: HOSPADM

## 2018-09-27 RX ORDER — MOXIFLOXACIN 5 MG/ML
1 SOLUTION/ DROPS OPHTHALMIC
Status: COMPLETED | OUTPATIENT
Start: 2018-09-27 | End: 2018-09-27

## 2018-09-27 RX ORDER — ALBUTEROL SULFATE 0.83 MG/ML
2.5 SOLUTION RESPIRATORY (INHALATION) EVERY 4 HOURS PRN
Status: DISCONTINUED | OUTPATIENT
Start: 2018-09-27 | End: 2018-09-28 | Stop reason: HOSPADM

## 2018-09-27 RX ORDER — NALOXONE HYDROCHLORIDE 0.4 MG/ML
.1-.4 INJECTION, SOLUTION INTRAMUSCULAR; INTRAVENOUS; SUBCUTANEOUS
Status: DISCONTINUED | OUTPATIENT
Start: 2018-09-27 | End: 2018-09-28 | Stop reason: HOSPADM

## 2018-09-27 RX ORDER — CYCLOPENTOLATE HYDROCHLORIDE 10 MG/ML
1 SOLUTION/ DROPS OPHTHALMIC
Status: DISCONTINUED | OUTPATIENT
Start: 2018-09-27 | End: 2018-09-28 | Stop reason: HOSPADM

## 2018-09-27 RX ORDER — PROPARACAINE HYDROCHLORIDE 5 MG/ML
1 SOLUTION/ DROPS OPHTHALMIC
Status: COMPLETED | OUTPATIENT
Start: 2018-09-27 | End: 2018-09-27

## 2018-09-27 RX ORDER — FENTANYL CITRATE 50 UG/ML
INJECTION, SOLUTION INTRAMUSCULAR; INTRAVENOUS PRN
Status: DISCONTINUED | OUTPATIENT
Start: 2018-09-27 | End: 2018-09-27

## 2018-09-27 RX ORDER — MEPERIDINE HYDROCHLORIDE 25 MG/ML
12.5 INJECTION INTRAMUSCULAR; INTRAVENOUS; SUBCUTANEOUS
Status: DISCONTINUED | OUTPATIENT
Start: 2018-09-27 | End: 2018-09-28 | Stop reason: HOSPADM

## 2018-09-27 RX ORDER — OXYCODONE HYDROCHLORIDE 5 MG/1
5-10 TABLET ORAL EVERY 4 HOURS PRN
Status: DISCONTINUED | OUTPATIENT
Start: 2018-09-27 | End: 2018-09-28 | Stop reason: HOSPADM

## 2018-09-27 RX ADMIN — PHENYLEPHRINE HYDROCHLORIDE 1 DROP: 25 SOLUTION/ DROPS OPHTHALMIC at 06:43

## 2018-09-27 RX ADMIN — PHENYLEPHRINE HYDROCHLORIDE 1 DROP: 25 SOLUTION/ DROPS OPHTHALMIC at 06:56

## 2018-09-27 RX ADMIN — PHENYLEPHRINE HYDROCHLORIDE 1 DROP: 25 SOLUTION/ DROPS OPHTHALMIC at 06:47

## 2018-09-27 RX ADMIN — PROPARACAINE HYDROCHLORIDE 1 DROP: 5 SOLUTION/ DROPS OPHTHALMIC at 06:40

## 2018-09-27 RX ADMIN — MOXIFLOXACIN 1 DROP: 5 SOLUTION/ DROPS OPHTHALMIC at 07:43

## 2018-09-27 RX ADMIN — TETRACAINE HYDROCHLORIDE 1 DROP: 5 SOLUTION OPHTHALMIC at 07:43

## 2018-09-27 RX ADMIN — CYCLOPENTOLATE HYDROCHLORIDE 1 DROP: 10 SOLUTION/ DROPS OPHTHALMIC at 06:46

## 2018-09-27 RX ADMIN — MOXIFLOXACIN 1 DROP: 5 SOLUTION/ DROPS OPHTHALMIC at 06:56

## 2018-09-27 RX ADMIN — FENTANYL CITRATE 50 MCG: 50 INJECTION, SOLUTION INTRAMUSCULAR; INTRAVENOUS at 07:35

## 2018-09-27 RX ADMIN — Medication 2 DROP: at 07:43

## 2018-09-27 RX ADMIN — MOXIFLOXACIN 1 DROP: 5 SOLUTION/ DROPS OPHTHALMIC at 06:43

## 2018-09-27 RX ADMIN — CYCLOPENTOLATE HYDROCHLORIDE 1 DROP: 10 SOLUTION/ DROPS OPHTHALMIC at 06:40

## 2018-09-27 RX ADMIN — MOXIFLOXACIN 1 DROP: 5 SOLUTION/ DROPS OPHTHALMIC at 06:47

## 2018-09-27 RX ADMIN — KETOROLAC TROMETHAMINE 1 DROP: 5 SOLUTION OPHTHALMIC at 06:42

## 2018-09-27 RX ADMIN — KETOROLAC TROMETHAMINE 1 DROP: 5 SOLUTION OPHTHALMIC at 06:55

## 2018-09-27 RX ADMIN — CYCLOPENTOLATE HYDROCHLORIDE 1 DROP: 10 SOLUTION/ DROPS OPHTHALMIC at 06:55

## 2018-09-27 RX ADMIN — KETOROLAC TROMETHAMINE 1 DROP: 5 SOLUTION OPHTHALMIC at 06:46

## 2018-09-27 RX ADMIN — FENTANYL CITRATE 50 MCG: 50 INJECTION, SOLUTION INTRAMUSCULAR; INTRAVENOUS at 07:38

## 2018-09-27 RX ADMIN — ACETAMINOPHEN 975 MG: 325 TABLET ORAL at 06:46

## 2018-09-27 RX ADMIN — Medication 250 ML: at 07:42

## 2018-09-27 NOTE — IP AVS SNAPSHOT
[facility]    [address] [place] [address]    Phone:  [phone]                                       After Visit Summary   9/27/2018    Jhonatan Crocker    MRN: [id number]           After Visit Summary Signature Page     I have received my discharge instructions, and my questions have been answered. I have discussed any challenges I see with this plan with the nurse or doctor.    ..........................................................................................................................................  Patient/Patient Representative Signature      ..........................................................................................................................................  Patient Representative Print Name and Relationship to Patient    ..................................................               ................................................  Date                                   Time    ..........................................................................................................................................  Reviewed by Signature/Title    ...................................................              ..............................................  Date                                               Time          22EPIC Rev 08/18

## 2018-09-27 NOTE — DISCHARGE INSTRUCTIONS
[facility]  Same-Day Surgery   Adult Discharge Orders & Instructions   For 24 hours after surgery  1. Get plenty of rest.  A responsible adult must stay with you for at least 24 hours after you leave the hospital.   2. Do not drive or use heavy equipment.  If you have weakness or tingling, don't drive or use heavy equipment until this feeling goes away.  3. Do not drink alcohol.  4. Avoid strenuous or risky activities.  Ask for help when climbing stairs.   5. You may feel lightheaded.  IF so, sit for a few minutes before standing.  Have someone help you get up.   6. If you have nausea (feel sick to your stomach): Drink only clear liquids such as apple juice, ginger ale, broth or 7-Up.  Rest may also help.  Be sure to drink enough fluids.  Move to a regular diet as you feel able.  7. You may have a slight fever. Call the doctor if your fever is over 100 F (37.7 C) (taken under the tongue) or lasts longer than 24 hours.  8. You may have a dry mouth, a sore throat, muscle aches or trouble sleeping.  These should go away after 24 hours.  9. Do not make important or legal decisions.   Call your doctor for any of the followin.  Signs of infection (fever, growing tenderness at the surgery site, a large amount of drainage or bleeding, severe pain, foul-smelling drainage, redness, swelling).    2. It has been over 8 to 10 hours since surgery and you are still not able to urinate (pass water).    3.  Headache for over 24 hours.  To contact a doctor, call ____[phone]_______________________              Jhonatan Crocker    Cataract Surgery Postoperative Instructions    Postoperative Medications: After surgery, you will use several different eye drop  medications. In most cases you will start these eye drops 2 days before surgery.    1. Ocuflox - is an antibiotic drop that is used to minimize the risk of infection. It should be used 4 times daily for 10 days total or until you are told to  discontinue.    (Acceptable alternatives to Ocuflox include: Zymaxid, Besivance, Gatafloxacin, Vigamox)     2.  Ketorolac - is an anti-inflammatory drop. Use it 4 time daily for 21 days total or until you are told to discontinue.  (Acceptable alternatives to Ketorolac include: Acuvail, Nevenac, Xibrom)    3. Prednisilone - is a steroid eye drop, used to minimize inflammation and modulate  healing. It should be used 4 times daily for 21 days total or until you are told to discontinue.  (Acceptable alternatives for Prednisilone include: Pred Forte, Omnipred, Econopred)      IF YOU GET AN ALTERNATIVE EYE DROP PLEASE FOLLOW THE DIRECTIONS ON THE BOTTLE OF DROPS. THEY WILL BE DIFFERENT!      The drops might sting a little when they are instilled, and that is normal.    It doesn t matter what order you put the drops into your eyes, but you should wait at least one minute between drops.    Please continue any glaucoma, dry eye, or other medications you were using prior to the surgery.    Please allow 24 to 48 hours when requesting refills, and call BEFORE you run out of drops.      Artificial Tears - are lubricating drops used to moisturize the eye. You can use these as much as you want, particularly if your eyes feel watery, gritty, or uncomfortable. Chilling these drops in the refrigerator results in a more soothing feeling. There are several brands of artificial tears available including, but not limited to: Optive, Refresh, Systane, Blink, Genteal, Soothe, and others. You should not use drops that  get the red out . You do not need a prescription for these medications.      Restriction on Activities - It is extremely important that you DO NOT RUB THE  TREATED EYE.  - You will be given a clear plastic shield to wear as protection over your eye the  night after surgery.  - Refrain from any activities that may put your eye at risk of injury, as well as areas  containing a high volume of chemicals, dust, and debris.  - Do  Not wear any eye makeup or moisturizer around the eye for 1 week after  surgery.  - Do Not swim or go into a hot-tub, Jacuzzi, or sauna for 1 week after surgery. You  can take showers as normal, but avoid getting shampoo or soap in your eyes.  - Avoid strenuous activity, including lifting more than 30 lbs, for 1 week after  surgery.  - It is fine to bathe, read, watch TV, and use the computer.  Symptoms requiring medical attention:  - Sudden onset of increased discharge from the eye  - Persistent or increasing pain in the eye  - Sudden decrease in vision  - Persistent nausea or vomiting    If you have any questions or concerns before or after your surgery, please contact:    Dr. Melendez s office at [phone]

## 2018-09-27 NOTE — ANESTHESIA CARE TRANSFER NOTE
Patient: Jhonatan Crocker    Procedure(s):  PHACOEMULSIFICATION WITH STANDARD INTRAOCULAR LENS IMPLANT  - Wound Class: I-Clean    Diagnosis: Right cataract  Diagnosis Additional Information: No value filed.    Anesthesia Type:   MAC     Note:  Airway :Room Air  Patient transferred to:Phase II  Handoff Report: Identifed the Patient, Identified the Reponsible Provider, Reviewed the pertinent medical history, Discussed the surgical course, Reviewed Intra-OP anesthesia mangement and issues during anesthesia, Set expectations for post-procedure period and Allowed opportunity for questions and acknowledgement of understanding      Vitals: (Last set prior to Anesthesia Care Transfer)    CRNA VITALS  9/27/2018 0727 - 9/27/2018 0757      9/27/2018             NIBP: (!)  159/91    Pulse: 74    NIBP Mean: 123    SpO2: 95 %                Electronically Signed By: PRASAD Rhoaeds CRNA  September 27, 2018  7:57 AM

## 2018-09-27 NOTE — IP AVS SNAPSHOT
MRN:[id number]                      After Visit Summary   9/27/2018    Jhonatan Crocker    MRN: [id number]           Thank you!     Thank you for choosing [facility] for your care. Our goal is always to provide you with excellent care. Hearing back from our patients is one way we can continue to improve our services. Please take a few minutes to complete the written survey that you may receive in the mail after you visit with us. Thank you!        Patient Information     Date Of Birth          1950        About your hospital stay     You were admitted on:  September 27, 2018 You last received care in the:  [facility]    You were discharged on:  September 27, 2018       Who to Call     For medical emergencies, please call 911.  For non-urgent questions about your medical care, please call your primary care provider or clinic, [phone]  For questions related to your surgery, please call your surgery clinic        Attending Provider     Provider Specialty    Jaden Melendez MD Ophthalmology       Primary Care Provider Office Phone # Fax #    Randy Casey Allred [phone]      Your next 10 appointments already scheduled     Sep 28, 2018 10:20 AM CDT   Return Visit with Crow Huerta OD   [facility])    [address] [facility] [address]   [phone]            Oct 05, 2018 11:00 AM CDT   Return Visit with Crow Huerta OD   [facility])    [address] [facility] [address]   [phone]            Oct 11, 2018   Procedure with Jaden Melendez MD   [facility] (--)    [address]  [facility] [address]   [phone]            Oct 12, 2018 11:20 AM CDT   Return Visit with Crow Huerta OD   [facility])    [address] [facility]  [address]   [phone]            Oct 17, 2018 11:40 AM CDT   Return Visit with Crow Huerta OD   [facility])    [address] [place] [address]   [phone]              Further instructions from your care team       [facility]  Same-Day Surgery   Adult Discharge Orders & Instructions   For 24 hours after surgery  1. Get plenty of rest.  A responsible adult must stay with you for at least 24 hours after you leave the hospital.   2. Do not drive or use heavy equipment.  If you have weakness or tingling, don't drive or use heavy equipment until this feeling goes away.  3. Do not drink alcohol.  4. Avoid strenuous or risky activities.  Ask for help when climbing stairs.   5. You may feel lightheaded.  IF so, sit for a few minutes before standing.  Have someone help you get up.   6. If you have nausea (feel sick to your stomach): Drink only clear liquids such as apple juice, ginger ale, broth or 7-Up.  Rest may also help.  Be sure to drink enough fluids.  Move to a regular diet as you feel able.  7. You may have a slight fever. Call the doctor if your fever is over 100 F (37.7 C) (taken under the tongue) or lasts longer than 24 hours.  8. You may have a dry mouth, a sore throat, muscle aches or trouble sleeping.  These should go away after 24 hours.  9. Do not make important or legal decisions.   Call your doctor for any of the followin.  Signs of infection (fever, growing tenderness at the surgery site, a large amount of drainage or bleeding, severe pain, foul-smelling drainage, redness, swelling).    2. It has been over 8 to 10 hours since surgery and you are still not able to urinate (pass water).    3.  Headache for over 24 hours.  To contact a doctor, call ____[phone]_______________________              Jhonatan Crocker    Cataract Surgery Postoperative Instructions    Postoperative Medications: After surgery,  you will use several different eye drop  medications. In most cases you will start these eye drops 2 days before surgery.    1. Ocuflox - is an antibiotic drop that is used to minimize the risk of infection. It should be used 4 times daily for 10 days total or until you are told to discontinue.    (Acceptable alternatives to Ocuflox include: Zymaxid, Besivance, Gatafloxacin, Vigamox)     2.  Ketorolac - is an anti-inflammatory drop. Use it 4 time daily for 21 days total or until you are told to discontinue.  (Acceptable alternatives to Ketorolac include: Acuvail, Nevenac, Xibrom)    3. Prednisilone - is a steroid eye drop, used to minimize inflammation and modulate  healing. It should be used 4 times daily for 21 days total or until you are told to discontinue.  (Acceptable alternatives for Prednisilone include: Pred Forte, Omnipred, Econopred)      IF YOU GET AN ALTERNATIVE EYE DROP PLEASE FOLLOW THE DIRECTIONS ON THE BOTTLE OF DROPS. THEY WILL BE DIFFERENT!      The drops might sting a little when they are instilled, and that is normal.    It doesn t matter what order you put the drops into your eyes, but you should wait at least one minute between drops.    Please continue any glaucoma, dry eye, or other medications you were using prior to the surgery.    Please allow 24 to 48 hours when requesting refills, and call BEFORE you run out of drops.      Artificial Tears - are lubricating drops used to moisturize the eye. You can use these as much as you want, particularly if your eyes feel watery, gritty, or uncomfortable. Chilling these drops in the refrigerator results in a more soothing feeling. There are several brands of artificial tears available including, but not limited to: Optive, Refresh, Systane, Blink, Genteal, Soothe, and others. You should not use drops that  get the red out . You do not need a prescription for these medications.      Restriction on Activities - It is extremely important that you DO  "NOT RUB THE  TREATED EYE.  - You will be given a clear plastic shield to wear as protection over your eye the  night after surgery.  - Refrain from any activities that may put your eye at risk of injury, as well as areas  containing a high volume of chemicals, dust, and debris.  - Do Not wear any eye makeup or moisturizer around the eye for 1 week after  surgery.  - Do Not swim or go into a hot-tub, Jacuzzi, or sauna for 1 week after surgery. You  can take showers as normal, but avoid getting shampoo or soap in your eyes.  - Avoid strenuous activity, including lifting more than 30 lbs, for 1 week after  surgery.  - It is fine to bathe, read, watch TV, and use the computer.  Symptoms requiring medical attention:  - Sudden onset of increased discharge from the eye  - Persistent or increasing pain in the eye  - Sudden decrease in vision  - Persistent nausea or vomiting    If you have any questions or concerns before or after your surgery, please contact:    Dr. Melendez s office at [phone]    Pending Results     No orders found from 9/25/2018 to 9/28/2018.            Admission Information     Date & Time Provider Department Dept. Phone    9/27/2018 Jaden Melendez MD [facility] [phone]      Your Vitals Were     Blood Pressure Temperature Respirations Weight Pulse Oximetry       152/76 97.7  F (36.5  C) (Temporal) 16 91.7 kg (202 lb 2.6 oz) 96%       MyChart Information     [employer] lets you send messages to your doctor, view your test results, renew your prescriptions, schedule appointments and more. To sign up, go to [email or web address] . Click on \"Log in\" on the left side of the screen, which will take you to the Welcome page. Then click on \"Sign up Now\" on the right side of the page.     You will be asked to enter the access code listed below, as well as some personal information. Please follow the directions to create your username and password.     Your access code is: " [email or web address]  Expires: 2018  8:03 AM     Your access code will  in 90 days. If you need help or a new code, please call your [facility] clinic or [phone].        Care EveryWhere ID     This is your Care EveryWhere ID. This could be used by other organizations to access your [facility] medical records  [id number]        Equal Access to Services     ABA HURTADO : Hadii aad ku hadasho Soomaali, waaxda luqadaha, qaybta kaalmada adeegyada, waxay idiin hayaan adecalli khkeenansh laLanaileen . So [facility] [phone].    ATENCIÓN: Si habla español, tiene a oliver disposición servicios gratuitos de asistencia lingüística. Llame al [phone].    We comply with applicable federal civil rights laws and [place] laws. We do not discriminate on the basis of race, color, national origin, age, disability, sex, sexual orientation, or gender identity.               Review of your medicines      UNREVIEWED medicines. Ask your doctor about these medicines        Dose / Directions    aspirin 325 MG tablet        Take by mouth daily   Refills:  0       ICAPS MV PO        Refills:  0       METOPROLOL TARTRATE PO        Dose:  50 mg   Take 50 mg by mouth daily   Refills:  0                Protect others around you: Learn how to safely use, store and throw away your medicines at www.disposemymeds.org.             Medication List: This is a list of all your medications and when to take them. Check marks below indicate your daily home schedule. Keep this list as a reference.      Medications           Morning Afternoon Evening Bedtime As Needed    aspirin 325 MG tablet   Take by mouth daily                                ICAPS MV PO                                METOPROLOL TARTRATE PO   Take 50 mg by mouth daily

## 2018-09-27 NOTE — OP NOTE
PATIENT NAME:  Jhonatan Crocker    :  1950    PATIENT NUMBER:  [id number]    DATE OF SURGERY:  2018    SURGEON:  Jdaen Melendez M.D.    PREOPERATIVE DIAGNOSIS: Cataract right eye.    POSTOPERATIVE DIAGNOSIS:  Same    PROCEDURE PERFORMED:    1. Phacoemulsification with posterior chamber intraocular lens right eye.    ANESTHESIA:  Topical/MAC    COMPLICATIONS:  None    PROCEDURE: Following adequate preoperative dilation the patient was given topical anesthesia consisting of Proparacaine.  The patient was brought to the operative suite where the eye was prepped and draped in the usual sterile fashion.  A lid speculum was applied. A super sharp blade was used to create a paracentesis, through which 1% preservative free Lidocaine was injected.  Visoelastic was then used to inflate the anterior chamber.  A biplanar incision at the clear cornea limbus was created with a keratome.  A continuous curvilinear capsulorrhexis was started with a cystitome and completed using Utrata forceps.  The lens was hydrodissected and hydro delineated using BSS on a cannula.  The lens nucleus was removed using phacoemulsification.  Remaining cortex was removed using irrigation and aspiration.  Viscoelastic was injected to inflate the capsular bag and a 20.5 D ZCB00 IOL was inserted into the capsular bag without difficulty.  Residual viscoelastic and provisc material was removed with irrigation and aspiration.  BSS was used to hydrate the corneal incision and paracentesis sites which were checked and noted to be watertight.  A drop of Vigamox was applied to the eye and a clear plastic shield was placed.  The patient tolerated the procedure well and left the operative suite in stable condition.    Jaden Melendez M.D.

## 2018-09-27 NOTE — ANESTHESIA PREPROCEDURE EVALUATION
Anesthesia Evaluation     .             ROS/MED HX    ENT/Pulmonary:  - neg pulmonary ROS     Neurologic:  - neg neurologic ROS     Cardiovascular:  - neg cardiovascular ROS   (+) hypertension----. : . . . :. .       METS/Exercise Tolerance:     Hematologic:  - neg hematologic  ROS       Musculoskeletal:  - neg musculoskeletal ROS       GI/Hepatic:  - neg GI/hepatic ROS       Renal/Genitourinary:  - ROS Renal section negative       Endo:  - neg endo ROS       Psychiatric:  - neg psychiatric ROS       Infectious Disease:  - neg infectious disease ROS       Malignancy:      - no malignancy   Other:    - neg other ROS                 Physical Exam  Normal systems: cardiovascular, pulmonary and dental    Airway   Mallampati: II  TM distance: >3 FB  Neck ROM: full    Dental     Cardiovascular       Pulmonary                     Anesthesia Plan      History & Physical Review  History and physical reviewed and following examination; no interval change.    ASA Status:  1 .    NPO Status:  > 8 hours    Plan for MAC with Intravenous induction. Maintenance will be TIVA.  Reason for MAC:  Procedure to face, neck, head or breast  PONV prophylaxis:  Ondansetron (or other 5HT-3)       Postoperative Care  Postoperative pain management:  IV analgesics, Oral pain medications and Multi-modal analgesia.      Consents  Anesthetic plan, risks, benefits and alternatives discussed with:  Patient..                          .

## 2018-09-27 NOTE — ANESTHESIA POSTPROCEDURE EVALUATION
Patient: Jhonatan Crocker    Procedure(s):  PHACOEMULSIFICATION WITH STANDARD INTRAOCULAR LENS IMPLANT  - Wound Class: I-Clean    Diagnosis:Right cataract  Diagnosis Additional Information: No value filed.    Anesthesia Type:  MAC    Note:  Anesthesia Post Evaluation    Patient location during evaluation: PACU  Patient participation: Able to fully participate in evaluation  Level of consciousness: awake  Pain management: adequate  Airway patency: patent  Cardiovascular status: acceptable  Respiratory status: acceptable  Hydration status: balanced  PONV: none     Anesthetic complications: None          Last vitals:  Vitals:    09/27/18 0630 09/27/18 0800 09/27/18 0814   BP: 156/86 152/76 140/77   Resp: 16 16 16   Temp: 36.5  C (97.7  F) 36.7  C (98  F) 36.7  C (98  F)   SpO2: 97% 96% 98%         Electronically Signed By: Jet Kim MD  September 27, 2018  2:40 PM

## 2018-09-28 ENCOUNTER — OFFICE VISIT (OUTPATIENT)
Dept: OPTOMETRY | Facility: CLINIC | Age: 68
End: 2018-09-28
Payer: COMMERCIAL

## 2018-09-28 ENCOUNTER — ANESTHESIA EVENT (OUTPATIENT)
Dept: SURGERY | Facility: AMBULATORY SURGERY CENTER | Age: 68
End: 2018-09-28

## 2018-09-28 DIAGNOSIS — Z96.1 PSEUDOPHAKIA OF RIGHT EYE: Primary | ICD-10-CM

## 2018-09-28 PROCEDURE — 99024 POSTOP FOLLOW-UP VISIT: CPT | Performed by: OPTOMETRIST

## 2018-09-28 ASSESSMENT — TONOMETRY
IOP_METHOD: TONOPEN
OD_IOP_MMHG: 28

## 2018-09-28 ASSESSMENT — VISUAL ACUITY
OD_SC+: -2
OS_SC+: -2
OS_SC: 20/40
METHOD: SNELLEN - LINEAR
OD_SC: 20/60

## 2018-09-28 ASSESSMENT — SLIT LAMP EXAM - LIDS: COMMENTS: NORMAL

## 2018-09-28 ASSESSMENT — EXTERNAL EXAM - RIGHT EYE: OD_EXAM: NORMAL

## 2018-09-28 NOTE — PROGRESS NOTES
CHIEF COMPLAINT:   Chief Complaint   Patient presents with     Surgical Followup     1 day post op cataract od eye       Type of surgery cataract   Date of surgery 9- od eye    Cherie Mederos, Optometric Assistant, A.B.O.C.     Drops reviewed.    OBJECTIVE:     See ophthalmology exam    ASSESSMENT:     No diagnosis found.  PLAN:    There are no Patient Instructions on file for this visit.

## 2018-09-28 NOTE — PATIENT INSTRUCTIONS
Continue with drops and scheduled follow up appointments.  Follow directions on your bottles as far as how many drops to use daily.    Wear shield while sleeping.       Please continue any glaucoma, dry eye, or other medications you were using prior to the surgery.        Crow Huerta, OD  [facility]  [address] [place] [address]  Tel- [phone]  [phone]

## 2018-09-28 NOTE — MR AVS SNAPSHOT
After Visit Summary   9/28/2018    Jhonatan Crocker    MRN: [id number]           Patient Information     Date Of Birth          1950        Visit Information        Provider Department      9/28/2018 10:20 AM Crow Huerta OD [facility]        Today's Diagnoses     Pseudophakia of right eye    -  1      Care Instructions    Continue with drops and scheduled follow up appointments.  Follow directions on your bottles as far as how many drops to use daily.    Wear shield while sleeping.       Please continue any glaucoma, dry eye, or other medications you were using prior to the surgery.        Crow Huerta OD  [facility]  [address]  [place] [address]  Tel- [phone]  [phone]            Follow-ups after your visit        Your next 10 appointments already scheduled     Oct 05, 2018 11:00 AM CDT   Return Visit with Crow Huerta OD   [facility] ([facility])    [address] [facility] [address] [phone]            Oct 11, 2018   Procedure with Jaden Melendez MD   [facility] (--)    [address]  [facility] [id number] [phone]            Oct 12, 2018 11:20 AM CDT   Return Visit with Crow Huerta OD   [facility] ([facility])    [address] [facility] [address] [phone]            Oct 17, 2018 11:40 AM CDT   Return Visit with Crow Huerta OD   [facility] ([facility])    [address] [facility] [address] [phone]              Who to contact     If you have questions or need follow up information about today's clinic visit or your schedule please contact [facility] directly at [phone].  Normal or non-critical lab and imaging results will be communicated to you by MyChart, letter or phone within 4 business days after the  clinic has received the results. If you do not hear from us within 7 days, please contact the clinic through Fitly or phone. If you have a critical or abnormal lab result, we will notify you by phone as soon as possible.  Submit refill requests through Fitly or call your pharmacy and they will forward the refill request to us. Please allow 3 business days for your refill to be completed.          Additional Information About Your Visit        Fitly Information     Fitly is an electronic gateway that provides easy, online access to your medical records. With Fitly, you can request a clinic appointment, read your test results, renew a prescription or communicate with your care team.     To sign up for Fitly visit the website at [email or web address]   You will be asked to enter the access code listed below, as well as some personal information. Please follow the directions to create your username and password.     Your access code is: [email or web address]  Expires: 2018  8:03 AM     Your access code will  in 90 days. If you need help or a new code, please contact your [facility] or call [phone] for assistance.        Care EveryWhere ID     This is your Care EveryWhere ID. This could be used by other organizations to access your [place] medical records  [id number]         Blood Pressure from Last 3 Encounters:   18 140/77    Weight from Last 3 Encounters:   18 91.7 kg (202 lb 2.6 oz)              We Performed the Following     POST-OP FOLLOW-UP VISIT        Primary Care Provider Office Phone # Fax #    Randy Allred [phone]       [address] [place] [address]        Equal Access to Services     [facility] : Hadprincess Haney, pete booth, sara najera. So [facility] [phone].    ATENCIÓN: Si habla español, tiene a oliver disposición  servicios gratuitos de asistencia lingüística. Jesús gallegos [phone].    We comply with applicable federal civil rights laws and [place] laws. We do not discriminate on the basis of race, color, national origin, age, disability, sex, sexual orientation, or gender identity.            Thank you!     Thank you for choosing [facility]  for your care. Our goal is always to provide you with excellent care. Hearing back from our patients is one way we can continue to improve our services. Please take a few minutes to complete the written survey that you may receive in the mail after your visit with us. Thank you!             Your Updated Medication List - Protect others around you: Learn how to safely use, store and throw away your medicines at www.disposemymeds.org.          This list is accurate as of 9/28/18 10:38 AM.  Always use your most recent med list.                   Brand Name Dispense Instructions for use Diagnosis    aspirin 325 MG tablet      Take by mouth daily        ICAPS MV PO           METOPROLOL TARTRATE PO      Take 50 mg by mouth daily

## 2018-10-05 ENCOUNTER — OFFICE VISIT (OUTPATIENT)
Dept: OPTOMETRY | Facility: CLINIC | Age: 68
End: 2018-10-05
Payer: COMMERCIAL

## 2018-10-05 DIAGNOSIS — H25.13 AGE-RELATED NUCLEAR CATARACT OF BOTH EYES: ICD-10-CM

## 2018-10-05 DIAGNOSIS — Z96.1 PSEUDOPHAKIA OF RIGHT EYE: Primary | ICD-10-CM

## 2018-10-05 PROCEDURE — 99207 ZZC POST PARTUM EXAM: CPT | Performed by: OPTOMETRIST

## 2018-10-05 RX ORDER — KETOROLAC TROMETHAMINE 5 MG/ML
1 SOLUTION OPHTHALMIC 4 TIMES DAILY
Qty: 1 BOTTLE | Refills: 1 | Status: SHIPPED | OUTPATIENT
Start: 2018-10-05 | End: 2019-10-30

## 2018-10-05 RX ORDER — PREDNISOLONE ACETATE 10 MG/ML
1 SUSPENSION/ DROPS OPHTHALMIC 4 TIMES DAILY
Qty: 1 BOTTLE | Refills: 1 | Status: SHIPPED | OUTPATIENT
Start: 2018-10-05 | End: 2019-10-30

## 2018-10-05 ASSESSMENT — VISUAL ACUITY
OS_SC: 20/20
OD_SC: 20/20
OD_SC+: -1
METHOD: SNELLEN - LINEAR
OS_SC+: -2

## 2018-10-05 ASSESSMENT — TONOMETRY: OD_IOP_MMHG: 14

## 2018-10-05 ASSESSMENT — EXTERNAL EXAM - RIGHT EYE: OD_EXAM: NORMAL

## 2018-10-05 ASSESSMENT — SLIT LAMP EXAM - LIDS: COMMENTS: NORMAL

## 2018-10-05 NOTE — PROGRESS NOTES
CHIEF COMPLAINT:   Chief Complaint   Patient presents with     Surgical Followup     1 week post op cataract od eye       Type of surgery cataract   Date of surgery 9- od eye    Cherie Mederos, Optometric Assistant, A.B.OAllysonC.     Drops reviewed.    OBJECTIVE:     See ophthalmology exam    ASSESSMENT:         ICD-10-CM    1. Pseudophakia of right eye Z96.1    2. Age-related nuclear cataract of both eyes H25.13 ketorolac (ACULAR) 0.5 % ophthalmic solution     prednisoLONE acetate (PRED FORTE) 1 % ophthalmic susp     PLAN:      Patient Instructions   Discontinue ofloxacin.  Continue prednisolone acetate and ketorolac for the full 21 days.    Ok to discontinue shield while sleeping.  All restrictions are lifted.    Start all 3 drops left eye Tuesday before next surgery.    Keep follow up appointments as scheduled.       Please continue any glaucoma, dry eye, or other medications you were using prior to the surgery.        Crow Huerta, OD  [facility]  [address] [place] [address]  Tel- [phone]

## 2018-10-05 NOTE — MR AVS SNAPSHOT
After Visit Summary   10/5/2018    Jhonatan Crocker    MRN: [id number]           Patient Information     Date Of Birth          1950        Visit Information        Provider Department      10/5/2018 11:00 AM Crow Huerta OD [facility]        Today's Diagnoses     Pseudophakia of right eye    -  1    Age-related nuclear cataract of both eyes          Care Instructions    Discontinue ofloxacin.  Continue prednisolone acetate and ketorolac for the full 21 days.    Ok to discontinue shield while sleeping.  All restrictions are lifted.    Start all 3 drops left eye Tuesday before next surgery.    Keep follow up appointments as scheduled.       Please continue any glaucoma, dry eye, or other medications you were using prior to the surgery.        Crow Huerta OD  [facility]  [address] [place] [address]  Tel- [phone]            Follow-ups after your visit        Follow-up notes from your care team     Return in about 1 week (around 10/12/2018) for Follow Up.      Your next 10 appointments already scheduled     Oct 11, 2018   Procedure with Jaden Melendez MD   [facility] [place]    [address]  [facility] [address] [phone]            Oct 12, 2018 11:20 AM CDT   Return Visit with Crow Huerta OD   [facility] ([facility])    [address] [facility] [address] [phone]            Oct 17, 2018 11:40 AM CDT   Return Visit with Crow Huerta OD   [facility] ([facility])    [address] [facility] [address] [phone]              Who to contact     If you have questions or need follow up information about today's clinic visit or your schedule please contact [facility] directly at [phone].  Normal or non-critical lab and imaging results will be communicated to you by MyChart, letter or  phone within 4 business days after the clinic has received the results. If you do not hear from us within 7 days, please contact the clinic through SIMI or phone. If you have a critical or abnormal lab result, we will notify you by phone as soon as possible.  Submit refill requests through SIMI or call your pharmacy and they will forward the refill request to us. Please allow 3 business days for your refill to be completed.          Additional Information About Your Visit        SIMI Information     SIMI is an electronic gateway that provides easy, online access to your medical records. With SIMI, you can request a clinic appointment, read your test results, renew a prescription or communicate with your care team.     To sign up for SIMI visit the website at [email or web address]   You will be asked to enter the access code listed below, as well as some personal information. Please follow the directions to create your username and password.     Your access code is: [email or web address]  Expires: 2018  8:03 AM     Your access code will  in 90 days. If you need help or a new code, please contact your [facility] or call [phone] for assistance.        Care EveryWhere ID     This is your Care EveryWhere ID. This could be used by other organizations to access your [place] medical records  [id number]         Blood Pressure from Last 3 Encounters:   18 140/77    Weight from Last 3 Encounters:   18 91.7 kg (202 lb 2.6 oz)              Today, you had the following     No orders found for display         Today's Medication Changes          These changes are accurate as of 10/5/18 11:33 AM.  If you have any questions, ask your nurse or doctor.               Start taking these medicines.        Dose/Directions    ketorolac 0.5 % ophthalmic solution   Commonly known as:  ACULAR   Used for:  Age-related nuclear cataract of both eyes        Dose:  1 drop    Apply 1 drop to eye 4 times daily Start 2 days prior to surgery in operative eye.   Quantity:  1 Bottle   Refills:  1       prednisoLONE acetate 1 % ophthalmic susp   Commonly known as:  PRED FORTE   Used for:  Age-related nuclear cataract of both eyes        Dose:  1 drop   Apply 1 drop to eye 4 times daily Start 2 days prior to surgery in operative eye.   Quantity:  1 Bottle   Refills:  1            Where to get your medicines      These medications were sent to [facility] [address] [place] [address] [place], [place] - [address] [place] AT [facility] [address] [place], [place] [address]     Phone:  [phone]     ketorolac 0.5 % ophthalmic solution    prednisoLONE acetate 1 % ophthalmic susp                Primary Care Provider Office Phone # Fax #    Randy Casey Allred [phone] [address] [facility] [address]        Equal Access to Services     [facility]: Hadii aad ku hadasho Soomaali, waaxda luqadaha, qaybta kaalmada adeegyada, waxay idiin hayaan dillon kharagus ruiz . So [facility] [phone].    ATENCIÓN: Si habla español, tiene a olvier disposición servicios gratuitos de asistencia lingüística. Llame al [phone].    We comply with applicable federal civil rights laws and [place] laws. We do not discriminate on the basis of race, color, national origin, age, disability, sex, sexual orientation, or gender identity.            Thank you!     Thank you for choosing [facility]  for your care. Our goal is always to provide you with excellent care. Hearing back from our patients is one way we can continue to improve our services. Please take a few minutes to complete the written survey that you may receive in the mail after your visit with us. Thank you!             Your Updated Medication List - Protect others around you: Learn how to safely use, store and throw away your medicines at www.disposemymeds.org.          This list is accurate  as of 10/5/18 11:33 AM.  Always use your most recent med list.                   Brand Name Dispense Instructions for use Diagnosis    aspirin 325 MG tablet      Take by mouth daily        ICAPS MV PO           ketorolac 0.5 % ophthalmic solution    ACULAR    1 Bottle    Apply 1 drop to eye 4 times daily Start 2 days prior to surgery in operative eye.    Age-related nuclear cataract of both eyes       METOPROLOL TARTRATE PO      Take 50 mg by mouth daily        prednisoLONE acetate 1 % ophthalmic susp    PRED FORTE    1 Bottle    Apply 1 drop to eye 4 times daily Start 2 days prior to surgery in operative eye.    Age-related nuclear cataract of both eyes

## 2018-10-05 NOTE — PATIENT INSTRUCTIONS
Discontinue ofloxacin.  Continue prednisolone acetate and ketorolac for the full 21 days.    Ok to discontinue shield while sleeping.  All restrictions are lifted.    Start all 3 drops left eye Tuesday before next surgery.    Keep follow up appointments as scheduled.       Please continue any glaucoma, dry eye, or other medications you were using prior to the surgery.        Crow Huerta, OD  [facility]  [address] [place] [address]  Tel- [phone]

## 2018-10-11 ENCOUNTER — ANESTHESIA (OUTPATIENT)
Dept: SURGERY | Facility: AMBULATORY SURGERY CENTER | Age: 68
End: 2018-10-11
Payer: COMMERCIAL

## 2018-10-11 ENCOUNTER — HOSPITAL ENCOUNTER (OUTPATIENT)
Facility: AMBULATORY SURGERY CENTER | Age: 68
Discharge: HOME | End: 2018-10-11
Attending: OPHTHALMOLOGY | Admitting: OPHTHALMOLOGY
Payer: COMMERCIAL

## 2018-10-11 ENCOUNTER — SURGERY (OUTPATIENT)
Age: 68
End: 2018-10-11
Payer: COMMERCIAL

## 2018-10-11 VITALS
TEMPERATURE: 98 F | HEIGHT: 73 IN | OXYGEN SATURATION: 95 % | SYSTOLIC BLOOD PRESSURE: 128 MMHG | RESPIRATION RATE: 18 BRPM | BODY MASS INDEX: 26.67 KG/M2 | DIASTOLIC BLOOD PRESSURE: 75 MMHG

## 2018-10-11 PROCEDURE — 66984 XCAPSL CTRC RMVL W/O ECP: CPT | Mod: 79 | Performed by: OPHTHALMOLOGY

## 2018-10-11 PROCEDURE — G8907 PT DOC NO EVENTS ON DISCHARG: HCPCS

## 2018-10-11 PROCEDURE — 66984 XCAPSL CTRC RMVL W/O ECP: CPT | Mod: LT

## 2018-10-11 PROCEDURE — G8918 PT W/O PREOP ORDER IV AB PRO: HCPCS

## 2018-10-11 DEVICE — EYE IMP IOL AMO PCL TECNIS ZCB00 20.5: Type: IMPLANTABLE DEVICE | Site: EYE | Status: FUNCTIONAL

## 2018-10-11 RX ORDER — MEPERIDINE HYDROCHLORIDE 25 MG/ML
12.5 INJECTION INTRAMUSCULAR; INTRAVENOUS; SUBCUTANEOUS
Status: DISCONTINUED | OUTPATIENT
Start: 2018-10-11 | End: 2018-10-12 | Stop reason: HOSPADM

## 2018-10-11 RX ORDER — MOXIFLOXACIN 5 MG/ML
SOLUTION/ DROPS OPHTHALMIC PRN
Status: DISCONTINUED | OUTPATIENT
Start: 2018-10-11 | End: 2018-10-11 | Stop reason: HOSPADM

## 2018-10-11 RX ORDER — LIDOCAINE 40 MG/G
CREAM TOPICAL
Status: DISCONTINUED | OUTPATIENT
Start: 2018-10-11 | End: 2018-10-12 | Stop reason: HOSPADM

## 2018-10-11 RX ORDER — KETAMINE HYDROCHLORIDE 10 MG/ML
INJECTION, SOLUTION INTRAMUSCULAR; INTRAVENOUS PRN
Status: DISCONTINUED | OUTPATIENT
Start: 2018-10-11 | End: 2018-10-11

## 2018-10-11 RX ORDER — FENTANYL CITRATE 50 UG/ML
25-50 INJECTION, SOLUTION INTRAMUSCULAR; INTRAVENOUS
Status: DISCONTINUED | OUTPATIENT
Start: 2018-10-11 | End: 2018-10-12 | Stop reason: HOSPADM

## 2018-10-11 RX ORDER — PROPARACAINE HYDROCHLORIDE 5 MG/ML
1 SOLUTION/ DROPS OPHTHALMIC
Status: COMPLETED | OUTPATIENT
Start: 2018-10-11 | End: 2018-10-11

## 2018-10-11 RX ORDER — MOXIFLOXACIN 5 MG/ML
1 SOLUTION/ DROPS OPHTHALMIC
Status: COMPLETED | OUTPATIENT
Start: 2018-10-11 | End: 2018-10-11

## 2018-10-11 RX ORDER — OXYCODONE HYDROCHLORIDE 5 MG/1
10 TABLET ORAL EVERY 4 HOURS PRN
Status: DISCONTINUED | OUTPATIENT
Start: 2018-10-11 | End: 2018-10-12 | Stop reason: HOSPADM

## 2018-10-11 RX ORDER — TETRACAINE HYDROCHLORIDE 5 MG/ML
SOLUTION OPHTHALMIC PRN
Status: DISCONTINUED | OUTPATIENT
Start: 2018-10-11 | End: 2018-10-11 | Stop reason: HOSPADM

## 2018-10-11 RX ORDER — METOPROLOL TARTRATE 1 MG/ML
1-2 INJECTION, SOLUTION INTRAVENOUS EVERY 5 MIN PRN
Status: DISCONTINUED | OUTPATIENT
Start: 2018-10-11 | End: 2018-10-12 | Stop reason: HOSPADM

## 2018-10-11 RX ORDER — HYDRALAZINE HYDROCHLORIDE 20 MG/ML
2.5-5 INJECTION INTRAMUSCULAR; INTRAVENOUS EVERY 10 MIN PRN
Status: DISCONTINUED | OUTPATIENT
Start: 2018-10-11 | End: 2018-10-12 | Stop reason: HOSPADM

## 2018-10-11 RX ORDER — ONDANSETRON 2 MG/ML
4 INJECTION INTRAMUSCULAR; INTRAVENOUS EVERY 30 MIN PRN
Status: DISCONTINUED | OUTPATIENT
Start: 2018-10-11 | End: 2018-10-12 | Stop reason: HOSPADM

## 2018-10-11 RX ORDER — DEXAMETHASONE SODIUM PHOSPHATE 4 MG/ML
4 INJECTION, SOLUTION INTRA-ARTICULAR; INTRALESIONAL; INTRAMUSCULAR; INTRAVENOUS; SOFT TISSUE EVERY 10 MIN PRN
Status: DISCONTINUED | OUTPATIENT
Start: 2018-10-11 | End: 2018-10-12 | Stop reason: HOSPADM

## 2018-10-11 RX ORDER — CYCLOPENTOLATE HYDROCHLORIDE 10 MG/ML
1 SOLUTION/ DROPS OPHTHALMIC
Status: COMPLETED | OUTPATIENT
Start: 2018-10-11 | End: 2018-10-11

## 2018-10-11 RX ORDER — PHENYLEPHRINE HYDROCHLORIDE 25 MG/ML
1 SOLUTION/ DROPS OPHTHALMIC
Status: COMPLETED | OUTPATIENT
Start: 2018-10-11 | End: 2018-10-11

## 2018-10-11 RX ORDER — HYDROMORPHONE HYDROCHLORIDE 1 MG/ML
.3-.5 INJECTION, SOLUTION INTRAMUSCULAR; INTRAVENOUS; SUBCUTANEOUS EVERY 10 MIN PRN
Status: DISCONTINUED | OUTPATIENT
Start: 2018-10-11 | End: 2018-10-12 | Stop reason: HOSPADM

## 2018-10-11 RX ORDER — ACETAMINOPHEN 325 MG/1
975 TABLET ORAL ONCE
Status: COMPLETED | OUTPATIENT
Start: 2018-10-11 | End: 2018-10-11

## 2018-10-11 RX ORDER — ALBUTEROL SULFATE 0.83 MG/ML
2.5 SOLUTION RESPIRATORY (INHALATION) EVERY 4 HOURS PRN
Status: DISCONTINUED | OUTPATIENT
Start: 2018-10-11 | End: 2018-10-12 | Stop reason: HOSPADM

## 2018-10-11 RX ORDER — NALOXONE HYDROCHLORIDE 0.4 MG/ML
.1-.4 INJECTION, SOLUTION INTRAMUSCULAR; INTRAVENOUS; SUBCUTANEOUS
Status: DISCONTINUED | OUTPATIENT
Start: 2018-10-11 | End: 2018-10-12 | Stop reason: HOSPADM

## 2018-10-11 RX ORDER — SODIUM CHLORIDE, SODIUM LACTATE, POTASSIUM CHLORIDE, CALCIUM CHLORIDE 600; 310; 30; 20 MG/100ML; MG/100ML; MG/100ML; MG/100ML
INJECTION, SOLUTION INTRAVENOUS CONTINUOUS
Status: DISCONTINUED | OUTPATIENT
Start: 2018-10-11 | End: 2018-10-12 | Stop reason: HOSPADM

## 2018-10-11 RX ORDER — KETOROLAC TROMETHAMINE 5 MG/ML
1 SOLUTION OPHTHALMIC
Status: COMPLETED | OUTPATIENT
Start: 2018-10-11 | End: 2018-10-11

## 2018-10-11 RX ORDER — ONDANSETRON 4 MG/1
4 TABLET, ORALLY DISINTEGRATING ORAL EVERY 30 MIN PRN
Status: DISCONTINUED | OUTPATIENT
Start: 2018-10-11 | End: 2018-10-12 | Stop reason: HOSPADM

## 2018-10-11 RX ORDER — PHYSOSTIGMINE SALICYLATE 1 MG/ML
1.2 INJECTION INTRAVENOUS
Status: DISCONTINUED | OUTPATIENT
Start: 2018-10-11 | End: 2018-10-12 | Stop reason: HOSPADM

## 2018-10-11 RX ADMIN — CYCLOPENTOLATE HYDROCHLORIDE 1 DROP: 10 SOLUTION/ DROPS OPHTHALMIC at 07:40

## 2018-10-11 RX ADMIN — MOXIFLOXACIN 1 DROP: 5 SOLUTION/ DROPS OPHTHALMIC at 07:30

## 2018-10-11 RX ADMIN — PHENYLEPHRINE HYDROCHLORIDE 1 DROP: 25 SOLUTION/ DROPS OPHTHALMIC at 07:40

## 2018-10-11 RX ADMIN — ACETAMINOPHEN 975 MG: 325 TABLET ORAL at 07:15

## 2018-10-11 RX ADMIN — PHENYLEPHRINE HYDROCHLORIDE 1 DROP: 25 SOLUTION/ DROPS OPHTHALMIC at 07:35

## 2018-10-11 RX ADMIN — CYCLOPENTOLATE HYDROCHLORIDE 1 DROP: 10 SOLUTION/ DROPS OPHTHALMIC at 07:30

## 2018-10-11 RX ADMIN — TETRACAINE HYDROCHLORIDE 1 DROP: 5 SOLUTION OPHTHALMIC at 08:48

## 2018-10-11 RX ADMIN — MOXIFLOXACIN 1 DROP: 5 SOLUTION/ DROPS OPHTHALMIC at 07:40

## 2018-10-11 RX ADMIN — MOXIFLOXACIN 1 DROP: 5 SOLUTION/ DROPS OPHTHALMIC at 07:35

## 2018-10-11 RX ADMIN — CYCLOPENTOLATE HYDROCHLORIDE 1 DROP: 10 SOLUTION/ DROPS OPHTHALMIC at 07:35

## 2018-10-11 RX ADMIN — Medication 2 DROP: at 08:47

## 2018-10-11 RX ADMIN — KETOROLAC TROMETHAMINE 1 DROP: 5 SOLUTION OPHTHALMIC at 07:30

## 2018-10-11 RX ADMIN — Medication 250 ML: at 08:46

## 2018-10-11 RX ADMIN — KETOROLAC TROMETHAMINE 1 DROP: 5 SOLUTION OPHTHALMIC at 07:35

## 2018-10-11 RX ADMIN — MOXIFLOXACIN 1 DROP: 5 SOLUTION/ DROPS OPHTHALMIC at 08:47

## 2018-10-11 RX ADMIN — PHENYLEPHRINE HYDROCHLORIDE 1 DROP: 25 SOLUTION/ DROPS OPHTHALMIC at 07:30

## 2018-10-11 RX ADMIN — KETAMINE HYDROCHLORIDE 15 MG: 10 INJECTION, SOLUTION INTRAMUSCULAR; INTRAVENOUS at 08:39

## 2018-10-11 RX ADMIN — KETOROLAC TROMETHAMINE 1 DROP: 5 SOLUTION OPHTHALMIC at 07:40

## 2018-10-11 RX ADMIN — PROPARACAINE HYDROCHLORIDE 1 DROP: 5 SOLUTION/ DROPS OPHTHALMIC at 07:27

## 2018-10-11 NOTE — IP AVS SNAPSHOT
MRN:[id number]                      After Visit Summary   10/11/2018    Jhonatan Crocker    MRN: [id number]           Thank you!     Thank you for choosing [facility] for your care. Our goal is always to provide you with excellent care. Hearing back from our patients is one way we can continue to improve our services. Please take a few minutes to complete the written survey that you may receive in the mail after you visit with us. Thank you!        Patient Information     Date Of Birth          1950        About your hospital stay     You were admitted on:  October 11, 2018 [place] in the[place]  [facility]    You were discharged on:  October 11, 2018       Who to Call     For medical emergencies, please call 911.  For non-urgent questions about your medical care, please call your primary care provider or clinic, [phone]  For questions related to your surgery, please call your surgery clinic        Attending Provider     Provider Specialty    Jaden Melendez MD Ophthalmology       Primary Care Provider Office Phone # Fax #    Randy Casey Allred [phone]      Your next 10 appointments already scheduled     Oct 12, 2018 11:20 AM CDT   Return Visit with Crow Huerta OD   [facility] ([facility]    [address] [place] [address] [phone]            Oct 17, 2018 11:40 AM CDT   Return Visit with Crow Huerta OD   [facility] ([facility])    [address] [place] [address] [phone]              Further instructions from your care team                Jhonatan Crocker    Cataract Surgery Postoperative Instructions    Postoperative Medications: After surgery, you will use several different eye drop  medications. In most cases you will start these eye drops 2 days before surgery.    1. Ocuflox - is an antibiotic drop that is used to  minimize the risk of infection. It should be used 4 times daily for 10 days total or until you are told to discontinue.    (Acceptable alternatives to Ocuflox include: Zymaxid, Besivance, Gatafloxacin, Vigamox)     2.  Ketorolac - is an anti-inflammatory drop. Use it 4 time daily for 21 days total or until you are told to discontinue.  (Acceptable alternatives to Ketorolac include: Acuvail, Nevenac, Xibrom)    3. Prednisilone - is a steroid eye drop, used to minimize inflammation and modulate  healing. It should be used 4 times daily for 21 days total or until you are told to discontinue.  (Acceptable alternatives for Prednisilone include: Pred Forte, Omnipred, Econopred)      IF YOU GET AN ALTERNATIVE EYE DROP PLEASE FOLLOW THE DIRECTIONS ON THE BOTTLE OF DROPS. THEY WILL BE DIFFERENT!      The drops might sting a little when they are instilled, and that is normal.    It doesn t matter what order you put the drops into your eyes, but you should wait at least one minute between drops.    Please continue any glaucoma, dry eye, or other medications you were using prior to the surgery.    Please allow 24 to 48 hours when requesting refills, and call BEFORE you run out of drops.      Artificial Tears - are lubricating drops used to moisturize the eye. You can use these as much as you want, particularly if your eyes feel watery, gritty, or uncomfortable. Chilling these drops in the refrigerator results in a more soothing feeling. There are several brands of artificial tears available including, but not limited to: Optive, Refresh, Systane, Blink, Genteal, Soothe, and others. You should not use drops that  get the red out . You do not need a prescription for these medications.      Restriction on Activities - It is extremely important that you DO NOT RUB THE  TREATED EYE.  - You will be given a clear plastic shield to wear as protection over your eye the  night after surgery.  - Refrain from any activities that may put  your eye at risk of injury, as well as areas  containing a high volume of chemicals, dust, and debris.  - Do Not wear any eye makeup or moisturizer around the eye for 1 week after  surgery.  - Do Not swim or go into a hot-tub, Jacuzzi, or sauna for 1 week after surgery. You  can take showers as normal, but avoid getting shampoo or soap in your eyes.  - Avoid strenuous activity, including lifting more than 30 lbs, for 1 week after  surgery.  - It is fine to bathe, read, watch TV, and use the computer.  Symptoms requiring medical attention:  - Sudden onset of increased discharge from the eye  - Persistent or increasing pain in the eye  - Sudden decrease in vision  - Persistent nausea or vomiting    If you have any questions or concerns before or after your surgery, please contact:    Dr. Melendez s office at [phone]    Tylenol was given at 7:15 am.        [facility]  Same-Day Surgery   Adult Discharge Orders & Instructions   For 24 hours after surgery  1. Get plenty of rest.  A responsible adult must stay with you for at least 24 hours after you leave the hospital.   2. Do not drive or use heavy equipment.  If you have weakness or tingling, don't drive or use heavy equipment until this feeling goes away.  3. Do not drink alcohol.  4. Avoid strenuous or risky activities.  Ask for help when climbing stairs.   5. You may feel lightheaded.  IF so, sit for a few minutes before standing.  Have someone help you get up.   6. If you have nausea (feel sick to your stomach): Drink only clear liquids such as apple juice, ginger ale, broth or 7-Up.  Rest may also help.  Be sure to drink enough fluids.  Move to a regular diet as you feel able.  7. You may have a slight fever. Call the doctor if your fever is over 100 F (37.7 C) (taken under the tongue) or lasts longer than 24 hours.  8. You may have a dry mouth, a sore throat, muscle aches or trouble sleeping.  These should go away after 24  "hours.  9. Do not make important or legal decisions.   Call your doctor for any of the followin.  Signs of infection (fever, growing tenderness at the surgery site, a large amount of drainage or bleeding, severe pain, foul-smelling drainage, redness, swelling).    2. It has been over 8 to 10 hours since surgery and you are still not able to urinate (pass water).    3.  Headache for over 24 hours.    To contact Dr Sanderson call:  [phone]    Pending Results     No orders found from 10/9/2018 to 10/12/2018.            Admission Information     Date & Time Provider Department Dept. Phone    10/11/2018 Jaden Melendez MD [facility] [phone]      Your Vitals Were     Blood Pressure Temperature Respirations Height Pulse Oximetry BMI (Body Mass Index)    110/73 98  F (36.7  C) (Temporal) 18 1.854 m (6' 1\") 95% 26.67 kg/m2      MyChart Information     [employer] lets you send messages to your doctor, view your test results, renew your prescriptions, schedule appointments and more. To sign up, go to [email or web address] . Click on \"Log in\" on the left side of the screen, which will take you to the Welcome page. Then click on \"Sign up Now\" on the right side of the page.     You will be asked to enter the access code listed below, as well as some personal information. Please follow the directions to create your username and password.     Your access code is: [email or web address]  Expires: 2018  8:03 AM     Your access code will  in 90 days. If you need help or a new code, please call your [place] clinic or [phone].        Care EveryWhere ID     This is your Care EveryWhere ID. This could be used by other organizations to access your [place] medical records  [id number]        Equal Access to Services     [facility] : Wiliam Haney, pete booth, sara najera . [facility] [phone].    ATENCIÓN: Si " oh ponce, tiene a oliver disposición servicios gratuitos de asistencia lingüística. Jesús gallegos [phone].    We comply with applicable federal civil rights laws and [place] laws. We do not discriminate on the basis of race, color, national origin, age, disability, sex, sexual orientation, or gender identity.               Review of your medicines      UNREVIEWED medicines. Ask your doctor about these medicines        Dose / Directions    aspirin 325 MG tablet        Take by mouth daily   Refills:  0       ICAPS MV PO        Refills:  0       ketorolac 0.5 % ophthalmic solution   Commonly known as:  ACULAR   Used for:  Age-related nuclear cataract of both eyes        Dose:  1 drop   Apply 1 drop to eye 4 times daily Start 2 days prior to surgery in operative eye.   Quantity:  1 Bottle   Refills:  1       METOPROLOL TARTRATE PO        Dose:  50 mg   Take 50 mg by mouth daily   Refills:  0       prednisoLONE acetate 1 % ophthalmic susp   Commonly known as:  PRED FORTE   Used for:  Age-related nuclear cataract of both eyes        Dose:  1 drop   Apply 1 drop to eye 4 times daily Start 2 days prior to surgery in operative eye.   Quantity:  1 Bottle   Refills:  1                Protect others around you: Learn how to safely use, store and throw away your medicines at www.disposemymeds.org.             Medication List: This is a list of all your medications and when to take them. Check marks below indicate your daily home schedule. Keep this list as a reference.      Medications           Morning Afternoon Evening Bedtime As Needed    aspirin 325 MG tablet   Take by mouth daily                                ICAPS MV PO                                ketorolac 0.5 % ophthalmic solution   Commonly known as:  ACULAR   Apply 1 drop to eye 4 times daily Start 2 days prior to surgery in operative eye.   Last time this was given:  1 drop on 10/11/2018  7:40 AM                                METOPROLOL TARTRATE PO   Take 50  mg by mouth daily                                prednisoLONE acetate 1 % ophthalmic susp   Commonly known as:  PRED FORTE   Apply 1 drop to eye 4 times daily Start 2 days prior to surgery in operative eye.

## 2018-10-11 NOTE — IP AVS SNAPSHOT
[facility]    [address] [place] [address]    Phone:  [phone]                                       After Visit Summary   10/11/2018    Jhonatan Crocker    MRN: [id number]           After Visit Summary Signature Page     I have received my discharge instructions, and my questions have been answered. I have discussed any challenges I see with this plan with the nurse or doctor.    ..........................................................................................................................................  Patient/Patient Representative Signature      ..........................................................................................................................................  Patient Representative Print Name and Relationship to Patient    ..................................................               ................................................  Date                                   Time    ..........................................................................................................................................  Reviewed by Signature/Title    ...................................................              ..............................................  Date                                               Time          22EPIC Rev 08/18

## 2018-10-11 NOTE — ANESTHESIA CARE TRANSFER NOTE
Patient: Jhonatan Crocker    Procedure(s):  PHACOEMULSIFICATION WITH STANDARD INTRAOCULAR LENS IMPLANT  - Wound Class: I-Clean    Diagnosis: Left cataract  Diagnosis Additional Information: No value filed.    Anesthesia Type:   MAC     Note:  Airway :Room Air  Patient transferred to:Phase II  Handoff Report: Identifed the Patient, Identified the Reponsible Provider, Reviewed the pertinent medical history, Discussed the surgical course, Reviewed Intra-OP anesthesia mangement and issues during anesthesia, Set expectations for post-procedure period and Allowed opportunity for questions and acknowledgement of understanding      Vitals: (Last set prior to Anesthesia Care Transfer)    CRNA VITALS  10/11/2018 0829 - 10/11/2018 0904      10/11/2018             Pulse: 72    SpO2: 99 %                Electronically Signed By: PRASAD Vogel CRNA  October 11, 2018  9:04 AM

## 2018-10-11 NOTE — ANESTHESIA POSTPROCEDURE EVALUATION
Patient: Jhonatan Crocker    Procedure(s):  PHACOEMULSIFICATION WITH STANDARD INTRAOCULAR LENS IMPLANT  - Wound Class: I-Clean    Diagnosis:Left cataract  Diagnosis Additional Information: No value filed.    Anesthesia Type:  MAC    Note:  Anesthesia Post Evaluation    Patient location during evaluation: Phase 2  Patient participation: Able to fully participate in evaluation  Level of consciousness: awake and alert  Pain management: adequate  Airway patency: patent  Cardiovascular status: acceptable  Respiratory status: acceptable  Hydration status: acceptable  PONV: none     Anesthetic complications: None          Last vitals:  Vitals:    10/11/18 0723 10/11/18 0902 10/11/18 0912   BP: 168/87 110/73 128/75   Resp: 18 18 18   Temp: 98.3  F (36.8  C) 98  F (36.7  C)    SpO2: 97% 95% 95%         Electronically Signed By: Gibran Wynn MD  October 11, 2018

## 2018-10-11 NOTE — DISCHARGE INSTRUCTIONS
Jhonatan Crocker    Cataract Surgery Postoperative Instructions    Postoperative Medications: After surgery, you will use several different eye drop  medications. In most cases you will start these eye drops 2 days before surgery.    1. Ocuflox - is an antibiotic drop that is used to minimize the risk of infection. It should be used 4 times daily for 10 days total or until you are told to discontinue.    (Acceptable alternatives to Ocuflox include: Zymaxid, Besivance, Gatafloxacin, Vigamox)     2.  Ketorolac - is an anti-inflammatory drop. Use it 4 time daily for 21 days total or until you are told to discontinue.  (Acceptable alternatives to Ketorolac include: Acuvail, Nevenac, Xibrom)    3. Prednisilone - is a steroid eye drop, used to minimize inflammation and modulate  healing. It should be used 4 times daily for 21 days total or until you are told to discontinue.  (Acceptable alternatives for Prednisilone include: Pred Forte, Omnipred, Econopred)      IF YOU GET AN ALTERNATIVE EYE DROP PLEASE FOLLOW THE DIRECTIONS ON THE BOTTLE OF DROPS. THEY WILL BE DIFFERENT!      The drops might sting a little when they are instilled, and that is normal.    It doesn t matter what order you put the drops into your eyes, but you should wait at least one minute between drops.    Please continue any glaucoma, dry eye, or other medications you were using prior to the surgery.    Please allow 24 to 48 hours when requesting refills, and call BEFORE you run out of drops.      Artificial Tears - are lubricating drops used to moisturize the eye. You can use these as much as you want, particularly if your eyes feel watery, gritty, or uncomfortable. Chilling these drops in the refrigerator results in a more soothing feeling. There are several brands of artificial tears available including, but not limited to: Optive, Refresh, Systane, Blink, Genteal, Soothe, and others. You should not use drops that  get the red out . You do not  need a prescription for these medications.      Restriction on Activities - It is extremely important that you DO NOT RUB THE  TREATED EYE.  - You will be given a clear plastic shield to wear as protection over your eye the  night after surgery.  - Refrain from any activities that may put your eye at risk of injury, as well as areas  containing a high volume of chemicals, dust, and debris.  - Do Not wear any eye makeup or moisturizer around the eye for 1 week after  surgery.  - Do Not swim or go into a hot-tub, Jacuzzi, or sauna for 1 week after surgery. You  can take showers as normal, but avoid getting shampoo or soap in your eyes.  - Avoid strenuous activity, including lifting more than 30 lbs, for 1 week after  surgery.  - It is fine to bathe, read, watch TV, and use the computer.  Symptoms requiring medical attention:  - Sudden onset of increased discharge from the eye  - Persistent or increasing pain in the eye  - Sudden decrease in vision  - Persistent nausea or vomiting    If you have any questions or concerns before or after your surgery, please contact:    Dr. Melendez s office at [phone]    Tylenol was given at 7:15 am.        [facility]  Same-Day Surgery   Adult Discharge Orders & Instructions   For 24 hours after surgery  1. Get plenty of rest.  A responsible adult must stay with you for at least 24 hours after you leave the hospital.   2. Do not drive or use heavy equipment.  If you have weakness or tingling, don't drive or use heavy equipment until this feeling goes away.  3. Do not drink alcohol.  4. Avoid strenuous or risky activities.  Ask for help when climbing stairs.   5. You may feel lightheaded.  IF so, sit for a few minutes before standing.  Have someone help you get up.   6. If you have nausea (feel sick to your stomach): Drink only clear liquids such as apple juice, ginger ale, broth or 7-Up.  Rest may also help.  Be sure to drink enough fluids.  Move to a  regular diet as you feel able.  7. You may have a slight fever. Call the doctor if your fever is over 100 F (37.7 C) (taken under the tongue) or lasts longer than 24 hours.  8. You may have a dry mouth, a sore throat, muscle aches or trouble sleeping.  These should go away after 24 hours.  9. Do not make important or legal decisions.   Call your doctor for any of the followin.  Signs of infection (fever, growing tenderness at the surgery site, a large amount of drainage or bleeding, severe pain, foul-smelling drainage, redness, swelling).    2. It has been over 8 to 10 hours since surgery and you are still not able to urinate (pass water).    3.  Headache for over 24 hours.    To contact Dr Sanderson call:  [phone]

## 2018-10-11 NOTE — OP NOTE
PATIENT NAME:  Jhonatan Crocker    :  1950    PATIENT NUMBER:  [id number]    DATE OF SURGERY:  10/11/2018    SURGEON:  Jaden Melendez M.D.    PREOPERATIVE DIAGNOSIS: Cataract left eye.    POSTOPERATIVE DIAGNOSIS:  Same    PROCEDURE PERFORMED:    1. Phacoemulsification with posterior chamber intraocular lens left eye.    ANESTHESIA:  Topical/MAC    COMPLICATIONS:  None    PROCEDURE: Following adequate preoperative dilation the patient was given topical anesthesia consisting of Proparacaine.  The patient was brought to the operative suite where the eye was prepped and draped in the usual sterile fashion.  A lid speculum was applied. A super sharp blade was used to create a paracentesis, through which 1% preservative free Lidocaine was injected.  Visoelastic was then used to inflate the anterior chamber.  A biplanar incision at the clear cornea limbus was created with a keratome.  A continuous curvilinear capsulorrhexis was started with a cystitome and completed using Utrata forceps.  The lens was hydrodissected and hydro delineated using BSS on a cannula.  The lens nucleus was removed using phacoemulsification.  Remaining cortex was removed using irrigation and aspiration.  Viscoelastic was injected to inflate the capsular bag and a 20.5 D ZCB00 IOL was inserted into the capsular bag without difficulty.  Residual viscoelastic and provisc material was removed with irrigation and aspiration.  BSS was used to hydrate the corneal incision and paracentesis sites which were checked and noted to be watertight.  A drop of Vigamox was applied to the eye and a clear plastic shield was placed.  The patient tolerated the procedure well and left the operative suite in stable condition.    Jaden Melendez M.D.

## 2018-10-11 NOTE — ANESTHESIA PREPROCEDURE EVALUATION
Physical Exam  Normal systems: cardiovascular, pulmonary and dental    Airway   Mallampati: II  TM distance: >3 FB  Neck ROM: full    Dental     Cardiovascular       Pulmonary                     Anesthesia Plan      History & Physical Review  History and physical reviewed and following examination; no interval change.    ASA Status:  2 .    NPO Status:  > 8 hours    Plan for MAC with Intravenous induction. Maintenance will be TIVA.  Reason for MAC:  Procedure to face, neck, head or breast         Postoperative Care  Postoperative pain management:  Multi-modal analgesia.      Consents  Anesthetic plan, risks, benefits and alternatives discussed with:  Patient..          ANESTHESIA PREOP EVALUATION    PROCEDURE: Procedure(s):  PHACOEMULSIFICATION WITH STANDARD INTRAOCULAR LENS IMPLANT  - Wound Class: I-Clean    HPI: Jhonatan Crocker is a 68 year old male who presents for above procedure 2/2 cataract.    PAST MEDICAL HISTORY:    Past Medical History:   Diagnosis Date     Cataracts, both eyes 1/23/2013       PAST SURGICAL HISTORY:    Past Surgical History:   Procedure Laterality Date     HERNIA REPAIR, UMBILICAL       Left brachial cleft cyst excision       PHACOEMULSIFICATION WITH STANDARD INTRAOCULAR LENS IMPLANT Right 9/27/2018    Procedure: PHACOEMULSIFICATION WITH STANDARD INTRAOCULAR LENS IMPLANT;  PHACOEMULSIFICATION WITH STANDARD INTRAOCULAR LENS IMPLANT ;  Surgeon: Jaden Melendez MD;  Location: [facility] OR       PAST ANESTHESIA HISTORY:     No personal or family h/o anesthesia problems    SOCIAL HISTORY:       Social History   Substance Use Topics     Smoking status: Never Smoker     Smokeless tobacco: Never Used     Alcohol use Not on file       ALLERGIES:     Allergies   Allergen Reactions     Penicillins        MEDICATIONS:       (Not in a hospital admission)    Current Outpatient Prescriptions   Medication Sig Dispense Refill     aspirin 325 MG tablet Take by mouth daily       METOPROLOL TARTRATE  PO Take 50 mg by mouth daily       Multiple Vitamins-Minerals (ICAPS MV PO)        ketorolac (ACULAR) 0.5 % ophthalmic solution Apply 1 drop to eye 4 times daily Start 2 days prior to surgery in operative eye. 1 Bottle 1     prednisoLONE acetate (PRED FORTE) 1 % ophthalmic susp Apply 1 drop to eye 4 times daily Start 2 days prior to surgery in operative eye. 1 Bottle 1       Current Outpatient Prescriptions Ordered in [facility]   Medication Sig Dispense Refill     aspirin 325 MG tablet Take by mouth daily       METOPROLOL TARTRATE PO Take 50 mg by mouth daily       Multiple Vitamins-Minerals (ICAPS MV PO)        ketorolac (ACULAR) 0.5 % ophthalmic solution Apply 1 drop to eye 4 times daily Start 2 days prior to surgery in operative eye. 1 Bottle 1     prednisoLONE acetate (PRED FORTE) 1 % ophthalmic susp Apply 1 drop to eye 4 times daily Start 2 days prior to surgery in operative eye. 1 Bottle 1     No current Epic-ordered facility-administered medications on file.        PHYSICAL EXAM:    Vitals: T Data Unavailable, P Data Unavailable, BP Data Unavailable, R Data Unavailable, SpO2  , Weight Wt Readings from Last 2 Encounters:   09/26/18 91.7 kg (202 lb 2.6 oz)       See doc flowsheet      NPO STATUS: see doc flowsheet    LABS:    BMP:  No results for input(s): NA, POTASSIUM, CHLORIDE, CO2, BUN, CR, GLC, HARRISON in the last 79205 hours.    LFTs:   No results for input(s): PROTTOTAL, ALBUMIN, BILITOTAL, ALKPHOS, AST, ALT, BILIDIRECT in the last 20743 hours.    CBC:   No results for input(s): WBC, RBC, HGB, HCT, MCV, MCH, MCHC, RDW, PLT in the last 31900 hours.    Coags:  No results for input(s): INR, PTT, FIBR in the last 98036 hours.    Imaging:  No orders to display       Gibran Wynn MD  Anesthesiology Staff  Pager [phone]    10/10/2018  9:08 PM                    .

## 2018-10-12 ENCOUNTER — OFFICE VISIT (OUTPATIENT)
Dept: OPTOMETRY | Facility: CLINIC | Age: 68
End: 2018-10-12
Payer: COMMERCIAL

## 2018-10-12 DIAGNOSIS — Z96.1 PSEUDOPHAKIA OF LEFT EYE: Primary | ICD-10-CM

## 2018-10-12 PROCEDURE — 99024 POSTOP FOLLOW-UP VISIT: CPT | Performed by: OPTOMETRIST

## 2018-10-12 ASSESSMENT — REFRACTION_WEARINGRX
OS_SPHERE: +1.00
OS_ADD: +2.75
OD_AXIS: 011
OD_SPHERE: +1.00
OD_SPHERE: +1.00
OS_SPHERE: +1.00
OS_AXIS: 003
OD_CYLINDER: +0.75
OS_AXIS: 015
OS_CYLINDER: +0.50
OS_CYLINDER: +0.50
OD_AXIS: 014
OD_CYLINDER: +0.50
SPECS_TYPE: AUTO/PAL
SPECS_TYPE: AUTO/SVL
OD_ADD: +2.75

## 2018-10-12 ASSESSMENT — VISUAL ACUITY
OS_SC+: -1
METHOD: SNELLEN - LINEAR
OS_SC: 20/40
OD_SC+: -2
OD_SC: 20/25

## 2018-10-12 ASSESSMENT — SLIT LAMP EXAM - LIDS: COMMENTS: NORMAL

## 2018-10-12 ASSESSMENT — TONOMETRY
OS_IOP_MMHG: 23
IOP_METHOD: TONOPEN

## 2018-10-12 NOTE — PATIENT INSTRUCTIONS
Continue with drops and scheduled follow up appointments.  Follow directions on your bottles as far as how many drops to use daily.    Wear shield while sleeping.       Please continue any glaucoma, dry eye, or other medications you were using prior to the surgery.        Crow Huerta, OD  [facility]  [address] [place] [address]  Tel- [phone]  [phone]

## 2018-10-12 NOTE — PROGRESS NOTES
CHIEF COMPLAINT:   Chief Complaint   Patient presents with     Surgical Followup     1 day post op cataract os eye       Type of surgery cataract   Date of surgery 10- os eye                            9- od eye    Cherie Mederos, Optometric Assistant, A.B.O.C.     Drops reviewed.    OBJECTIVE:     See ophthalmology exam    ASSESSMENT:         ICD-10-CM    1. Pseudophakia of left eye Z96.1 POST-OP FOLLOW-UP VISIT     PLAN:      Patient Instructions   Continue with drops and scheduled follow up appointments.  Follow directions on your bottles as far as how many drops to use daily.    Wear shield while sleeping.       Please continue any glaucoma, dry eye, or other medications you were using prior to the surgery.        Crow Huerta, OD  [facility]  [address] [place] [address]  Tel- [phone]  [phone]

## 2018-10-17 ENCOUNTER — OFFICE VISIT (OUTPATIENT)
Dept: OPTOMETRY | Facility: CLINIC | Age: 68
End: 2018-10-17
Payer: COMMERCIAL

## 2018-10-17 DIAGNOSIS — Z96.1 PSEUDOPHAKIA OF LEFT EYE: Primary | ICD-10-CM

## 2018-10-17 PROCEDURE — 99024 POSTOP FOLLOW-UP VISIT: CPT | Performed by: OPTOMETRIST

## 2018-10-17 ASSESSMENT — REFRACTION_WEARINGRX
OD_AXIS: 011
OD_CYLINDER: +0.50
OS_CYLINDER: +0.50
OS_SPHERE: +1.00
OD_SPHERE: +1.00
SPECS_TYPE: AUTO/SVL
SPECS_TYPE: AUTO/PAL
OS_AXIS: 015
OS_AXIS: 003
OD_ADD: +2.75
OS_CYLINDER: +0.50
OS_SPHERE: +1.00
OD_AXIS: 014
OD_CYLINDER: +0.75
OD_SPHERE: +1.00
OS_ADD: +2.75

## 2018-10-17 ASSESSMENT — VISUAL ACUITY
OD_SC: 20/20
METHOD: SNELLEN - LINEAR
OS_SC: 20/20

## 2018-10-17 ASSESSMENT — TONOMETRY
OS_IOP_MMHG: 14
IOP_METHOD: TONOPEN

## 2018-10-17 ASSESSMENT — SLIT LAMP EXAM - LIDS: COMMENTS: NORMAL

## 2018-10-17 NOTE — MR AVS SNAPSHOT
After Visit Summary   10/17/2018    Jhonatan Crocker    MRN: [id number]           Patient Information     Date Of Birth          1950        Visit Information        Provider Department      10/17/2018 11:40 AM Crow Huerta, OD [facility]        Today's Diagnoses     Pseudophakia of left eye    -  1      Care Instructions    Discontinue ofloxacin.  Continue prednisolone acetate and ketorolac for the full 21 days.    Ok to discontinue shield while sleeping.  All restrictions are lifted.    Keep follow up appointments as scheduled.       Please continue any glaucoma, dry eye, or other medications you were using prior to the surgery.        Crow Huerta OD  [facility]  [address] [place] [address]  Tel- [phone]            Follow-ups after your visit        Your next 10 appointments already scheduled     Nov 07, 2018 12:20 PM CST   Return Visit with Crow Huerta OD   [facility]    [address] [place] [address] [phone]              Who to contact     If you have questions or need follow up information about today's clinic visit or your schedule please contact [facility] directly at [phone].  Normal or non-critical lab and imaging results will be communicated to you by Coghead[employer], letter or phone within 4 business days after the clinic has received the results. If you do not hear from us within 7 days, please contact the clinic through C[employer]head[employer] or phone. If you have a critical or abnormal lab result, we will notify you by phone as soon as possible.  Submit refill requests through [employer] or call your pharmacy and they will forward the refill request to us. Please allow 3 business days for your refill to be completed.          Additional Information About Your Visit        [employer] Information     [employer] is an electronic gateway that provides easy, online  access to your medical records. With [employer], you can request a clinic appointment, read your test results, renew a prescription or communicate with your care team.     To sign up for [employer] visit the website at [email or web address]   You will be asked to enter the access code listed below, as well as some personal information. Please follow the directions to create your username and password.     Your access code is: [email or web address]  Expires: 2018  8:03 AM     Your access code will  in 90 days. If you need help or a new code, please contact your [facility] or call [phone] for assistance.        Care EveryWhere ID     This is your Care EveryWhere ID. This could be used by other organizations to access your [facility] medical records  [id number]         Blood Pressure from Last 3 Encounters:   10/11/18 128/75   18 140/77    Weight from Last 3 Encounters:   18 91.7 kg (202 lb 2.6 oz)              Today, you had the following     No orders found for display       Primary Care Provider Office Phone # Fax #    Randy Allred [phone]       [address] [facility] [address]        Equal Access to Services     ADILENE HURTADO : Hadii aad ku hadasho Soomaali, waaxda luqadaha, qaybta kaalmada adeegyada, waxay idiin hayaan arieleg kharagus la'aileen ah. So [facility] [phone].    ATENCIÓN: Si habla español, tiene a oliver disposición servicios gratuitos de asistencia lingüística. Llame al [phone].    We comply with applicable federal civil rights laws and [place] laws. We do not discriminate on the basis of race, color, national origin, age, disability, sex, sexual orientation, or gender identity.            Thank you!     Thank you for choosing [facility]  for your care. Our goal is always to provide you with excellent care. Hearing back from our patients is one way we can continue to improve our services. Please  take a few minutes to complete the written survey that you may receive in the mail after your visit with us. Thank you!             Your Updated Medication List - Protect others around you: Learn how to safely use, store and throw away your medicines at www.disposemymeds.org.          This list is accurate as of 10/17/18 12:06 PM.  Always use your most recent med list.                   Brand Name Dispense Instructions for use Diagnosis    aspirin 325 MG tablet      Take by mouth daily        ICAPS MV PO           ketorolac 0.5 % ophthalmic solution    ACULAR    1 Bottle    Apply 1 drop to eye 4 times daily Start 2 days prior to surgery in operative eye.    Age-related nuclear cataract of both eyes       METOPROLOL TARTRATE PO      Take 50 mg by mouth daily        prednisoLONE acetate 1 % ophthalmic susp    PRED FORTE    1 Bottle    Apply 1 drop to eye 4 times daily Start 2 days prior to surgery in operative eye.    Age-related nuclear cataract of both eyes

## 2018-10-17 NOTE — PATIENT INSTRUCTIONS
Discontinue ofloxacin.  Continue prednisolone acetate and ketorolac for the full 21 days.    Ok to discontinue shield while sleeping.  All restrictions are lifted.    Keep follow up appointments as scheduled.       Please continue any glaucoma, dry eye, or other medications you were using prior to the surgery.        Crow Huerta, OD  [facility]  [address] [place] [address]  Tel- [phone]

## 2018-10-17 NOTE — PROGRESS NOTES
CHIEF COMPLAINT:   Chief Complaint   Patient presents with     Post Op (Ophthalmology) Left Eye     1 week post cataract       Type of surgery  Cataract left eye   Date of surgery  10/11/2018           Drops reviewed.    OBJECTIVE:     See ophthalmology exam    ASSESSMENT:         ICD-10-CM    1. Pseudophakia of left eye Z96.1      PLAN:      Patient Instructions   Discontinue ofloxacin.  Continue prednisolone acetate and ketorolac for the full 21 days.    Ok to discontinue shield while sleeping.  All restrictions are lifted.    Keep follow up appointments as scheduled.       Please continue any glaucoma, dry eye, or other medications you were using prior to the surgery.        Crow Huerta, OD  [facility]  [address] [place] [address]  Tel- [phone]

## 2018-11-07 ENCOUNTER — OFFICE VISIT (OUTPATIENT)
Dept: OPTOMETRY | Facility: CLINIC | Age: 68
End: 2018-11-07
Payer: COMMERCIAL

## 2018-11-07 DIAGNOSIS — Z96.1 PSEUDOPHAKIA OF BOTH EYES: Primary | ICD-10-CM

## 2018-11-07 PROCEDURE — 99024 POSTOP FOLLOW-UP VISIT: CPT | Performed by: OPTOMETRIST

## 2018-11-07 ASSESSMENT — REFRACTION_WEARINGRX
OD_SPHERE: +1.00
OD_ADD: +2.75
OD_CYLINDER: +0.50
OS_CYLINDER: +0.50
OS_SPHERE: +1.00
OS_CYLINDER: +0.50
SPECS_TYPE: AUTO/PAL
OD_CYLINDER: +0.75
OS_AXIS: 003
OS_ADD: +2.75
OD_SPHERE: +1.00
OS_AXIS: 015
OD_AXIS: 011
OS_SPHERE: +1.00
OD_AXIS: 014
SPECS_TYPE: AUTO/SVL

## 2018-11-07 ASSESSMENT — KERATOMETRY
OS_AXISANGLE2_DEGREES: 173
OS_K2POWER_DIOPTERS: 44.12
OD_AXISANGLE2_DEGREES: 71
OD_K1POWER_DIOPTERS: 43.75
OD_K2POWER_DIOPTERS: 44.12
OS_K1POWER_DIOPTERS: 44.00

## 2018-11-07 ASSESSMENT — VISUAL ACUITY
OS_SC: 20/200
OS_SC: 20/20
METHOD: SNELLEN - LINEAR
OD_SC: 20/20
OD_SC+: -1
OD_SC: 20/200

## 2018-11-07 ASSESSMENT — CUP TO DISC RATIO
OS_RATIO: 0.15
OD_RATIO: 0.15

## 2018-11-07 ASSESSMENT — REFRACTION_MANIFEST
OD_CYLINDER: +0.50
OD_SPHERE: -0.25
OS_ADD: +2.50
OD_ADD: +2.50
OS_SPHERE: -0.25
OD_AXIS: 035

## 2018-11-07 ASSESSMENT — PACHYMETRY
OD_CT(UM): 576
OS_CT(UM): 586

## 2018-11-07 ASSESSMENT — SLIT LAMP EXAM - LIDS
COMMENTS: NORMAL
COMMENTS: NORMAL

## 2018-11-07 ASSESSMENT — TONOMETRY
OS_IOP_MMHG: 16
OD_IOP_MMHG: 12
IOP_METHOD: TONOPEN

## 2018-11-07 ASSESSMENT — EXTERNAL EXAM - LEFT EYE: OS_EXAM: NORMAL

## 2018-11-07 ASSESSMENT — EXTERNAL EXAM - RIGHT EYE: OD_EXAM: NORMAL

## 2018-11-07 NOTE — MR AVS SNAPSHOT
After Visit Summary   11/7/2018    Jhonatan Crocker    MRN: [id number]           Patient Information     Date Of Birth          1950        Visit Information        Provider Department      11/7/2018 12:20 PM Crwo Huerta, OD [facility]        Today's Diagnoses     Pseudophakia of both eyes    -  1      Care Instructions    Eyeglass prescription given.    Return in 1 year for a complete eye exam or sooner if needed.    Crow Huerta OD            Follow-ups after your visit        Follow-up notes from your care team     Return in about 1 year (around 11/7/2019) for Annual Visit.      Who to contact     If you have questions or need follow up information about today's clinic visit or your schedule please contact [facility] directly at [phone].  Normal or non-critical lab and imaging results will be communicated to you by MyChart, letter or phone within 4 business days after the clinic has received the results. If you do not hear from us within 7 days, please contact the clinic through MyChart or phone. If you have a critical or abnormal lab result, we will notify you by phone as soon as possible.  Submit refill requests through [employer] or call your pharmacy and they will forward the refill request to us. Please allow 3 business days for your refill to be completed.          Additional Information About Your Visit        [employer] Information     [employer] is an electronic gateway that provides easy, online access to your medical records. With [employer], you can request a clinic appointment, read your test results, renew a prescription or communicate with your care team.     To sign up for [employer] visit the website at [email or web address]"   You will be asked to enter the access code listed below, as well as some personal information. Please follow the directions to create your username and password.     Your access code is: [email or web address]  Expires: 12/26/2018   7:03 AM     Your access code will  in 90 days. If you need help or a new code, please contact your [facility] or call [phone] for assistance.        Care EveryWhere ID     This is your Care EveryWhere ID. This could be used by other organizations to access your [facility] medical records  [id number]         Blood Pressure from Last 3 Encounters:   10/11/18 128/75   18 140/77    Weight from Last 3 Encounters:   18 91.7 kg (202 lb 2.6 oz)              We Performed the Following     POST-OP FOLLOW-UP VISIT        Primary Care Provider Office Phone # Fax #    Randy Casey Allred [phone]       [address] [address]        Equal Access to Services     ADILENE HURTADO : Hadii dc sun hadasho Soomaali, waaxda luqadaha, qaybta kaalmada adeegyada, waxay idiin haytangn dillon ruiz . So [facility] [phone].    ATENCIÓN: Si habla español, tiene a oliver disposición servicios gratuitos de asistencia lingüística. Llame al [phone].    We comply with applicable federal civil rights laws and [place] laws. We do not discriminate on the basis of race, color, national origin, age, disability, sex, sexual orientation, or gender identity.            Thank you!     Thank you for choosing [facility]  for your care. Our goal is always to provide you with excellent care. Hearing back from our patients is one way we can continue to improve our services. Please take a few minutes to complete the written survey that you may receive in the mail after your visit with us. Thank you!             Your Updated Medication List - Protect others around you: Learn how to safely use, store and throw away your medicines at www.disposemymeds.org.          This list is accurate as of 18  1:14 PM.  Always use your most recent med list.                   Brand Name Dispense Instructions for use Diagnosis    aspirin 325 MG tablet       Take by mouth daily        ICAPS MV PO           ketorolac 0.5 % ophthalmic solution    ACULAR    1 Bottle    Apply 1 drop to eye 4 times daily Start 2 days prior to surgery in operative eye.    Age-related nuclear cataract of both eyes       METOPROLOL TARTRATE PO      Take 50 mg by mouth daily        prednisoLONE acetate 1 % ophthalmic susp    PRED FORTE    1 Bottle    Apply 1 drop to eye 4 times daily Start 2 days prior to surgery in operative eye.    Age-related nuclear cataract of both eyes

## 2018-11-07 NOTE — PROGRESS NOTES
CHIEF COMPLAINT:   Chief Complaint   Patient presents with     Surgical Followup     final [facility]        Type of surgery  Cataract both eyes   Date of surgery-   Right eye -9/27/18     Left eye- 10/11/18    Drops reviewed.    OBJECTIVE:     See ophthalmology exam    ASSESSMENT:         ICD-10-CM    1. Pseudophakia of both eyes Z96.1 POST-OP FOLLOW-UP VISIT   Good result    PLAN:      Patient Instructions   Eyeglass prescription given.    Return in 1 year for a complete eye exam or sooner if needed.    Crow Huerta, OD

## 2018-11-07 NOTE — PATIENT INSTRUCTIONS
Eyeglass prescription given.    Return in 1 year for a complete eye exam or sooner if needed.    Crow Huerta, OD

## 2019-10-30 ENCOUNTER — OFFICE VISIT (OUTPATIENT)
Dept: OPTOMETRY | Facility: CLINIC | Age: 69
End: 2019-10-30
Payer: COMMERCIAL

## 2019-10-30 DIAGNOSIS — H52.4 PRESBYOPIA: ICD-10-CM

## 2019-10-30 DIAGNOSIS — Z96.1 PSEUDOPHAKIA OF BOTH EYES: Primary | ICD-10-CM

## 2019-10-30 DIAGNOSIS — H10.13 ALLERGIC CONJUNCTIVITIS OF BOTH EYES: ICD-10-CM

## 2019-10-30 DIAGNOSIS — H52.223 REGULAR ASTIGMATISM OF BOTH EYES: ICD-10-CM

## 2019-10-30 DIAGNOSIS — H52.03 HYPERMETROPIA OF BOTH EYES: ICD-10-CM

## 2019-10-30 PROCEDURE — 92015 DETERMINE REFRACTIVE STATE: CPT | Performed by: OPTOMETRIST

## 2019-10-30 PROCEDURE — 92014 COMPRE OPH EXAM EST PT 1/>: CPT | Performed by: OPTOMETRIST

## 2019-10-30 RX ORDER — ATORVASTATIN CALCIUM 20 MG/1
TABLET, FILM COATED ORAL
Refills: 0 | COMMUNITY
Start: 2019-09-04

## 2019-10-30 RX ORDER — OLOPATADINE HYDROCHLORIDE 2 MG/ML
1 SOLUTION/ DROPS OPHTHALMIC DAILY
Qty: 1 BOTTLE | Refills: 11 | Status: SHIPPED | OUTPATIENT
Start: 2019-10-30 | End: 2020-10-29

## 2019-10-30 ASSESSMENT — VISUAL ACUITY
METHOD: SNELLEN - LINEAR
OS_CC+: -2
OS_CC: 20/20
OD_CC: 20/20
OD_SC+: -3
OS_CC: 20/25-1
OS_SC+: -1
CORRECTION_TYPE: GLASSES
OD_CC: 20/25-1
OD_SC: 20/20
OS_SC: 20/20

## 2019-10-30 ASSESSMENT — REFRACTION_WEARINGRX
OD_ADD: +2.50
OD_SPHERE: -0.25
OS_SPHERE: -0.25
OD_AXIS: 035
OD_CYLINDER: +0.50
OS_ADD: +2.50
SPECS_TYPE: PAL

## 2019-10-30 ASSESSMENT — CONF VISUAL FIELD
OD_NORMAL: 1
OS_NORMAL: 1

## 2019-10-30 ASSESSMENT — TONOMETRY
OD_IOP_MMHG: 17
OS_IOP_MMHG: 17
IOP_METHOD: TONOPEN

## 2019-10-30 ASSESSMENT — REFRACTION_MANIFEST
OD_SPHERE: PLANO
OS_CYLINDER: +0.25
OD_ADD: +2.50
OS_AXIS: 175
OS_ADD: +2.50
OD_CYLINDER: +0.75
OS_SPHERE: -0.25
OD_AXIS: 175

## 2019-10-30 ASSESSMENT — CUP TO DISC RATIO
OS_RATIO: 0.15
OD_RATIO: 0.15

## 2019-10-30 ASSESSMENT — EXTERNAL EXAM - LEFT EYE: OS_EXAM: NORMAL

## 2019-10-30 ASSESSMENT — EXTERNAL EXAM - RIGHT EYE: OD_EXAM: NORMAL

## 2019-10-30 ASSESSMENT — SLIT LAMP EXAM - LIDS
COMMENTS: NORMAL
COMMENTS: NORMAL

## 2019-10-30 NOTE — PROGRESS NOTES
Chief Complaint   Patient presents with     Annual Eye Exam         Last Eye Exam: 6-  Dilated Previously: Yes    What are you currently using to see?  glasses       Distance Vision Acuity: Satisfied with vision    Near Vision Acuity: Satisfied with vision while reading  with glasses    Eye Comfort: good,allergies in [place]  Do you use eye drops? : No  Occupation or Hobbies: retired    Cherie Mederos Optometric Assistant, A.B.O[employer]C.          Medical, surgical and family histories reviewed and updated 10/30/2019.       OBJECTIVE: See Ophthalmology exam    ASSESSMENT:    ICD-10-CM    1. Pseudophakia of both eyes Z96.1 EYE EXAM (SIMPLE-NONBILLABLE)   2. Presbyopia H52.4 REFRACTION   3. Hypermetropia of both eyes H52.03 REFRACTION   4. Regular astigmatism of both eyes H52.223 REFRACTION   5. Allergic conjunctivitis of both eyes H10.13 EYE EXAM (SIMPLE-NONBILLABLE)     olopatadine (PATADAY) 0.2 % ophthalmic solution      PLAN:     Patient Instructions   Eyeglass prescription given.  Optional change in eyeglass prescription.    Prescription for Pataday to be used once daily for itchy eyes.  Use as needed.    Return in 1 year for a complete eye exam or sooner if needed.    Crow Huerta, OD

## 2019-10-30 NOTE — PATIENT INSTRUCTIONS
Eyeglass prescription given.  Optional change in eyeglass prescription.    Prescription for Pataday to be used once daily for itchy eyes.  Use as needed.    Return in 1 year for a complete eye exam or sooner if needed.    Crow Huerta, EZEKIEL    The affects of the dilating drops last for 4- 6 hours.  You will be more sensitive to light and vision will be blurry up close.  Mydriatic sunglasses were given if needed.      Optometry Providers       Clinic Locations                                 Telephone Number   Dr. Shelly Hernández    [place]   [place]   [place] [phone]     [place] [facility] Hours:                [place] [facility] Hours:       [place] [facility] Hours:   [address]   [address]      [address] [facility]  [place] [address]   [place] [address]    [place] [address]  Phone: [phone]                    Phone: [phone]     Phone: [phone]                      Monday 8:00-7:00                          Monday 8:00-7:00                          Monday 8:00-7:00              Tuesday 8:00-6:00                          Tuesday 8:00-7:00                          Tuesday 8:00-7:00              Wednesday 8:00-6:00                  Wednesday 8:00-7:00                   Wednesday 8:00-7:00      Thursday 8:00-6:00                        Thursday 8:00-7:00                         Thursday 8:00-7:00            Friday 8:00-5:00                              Friday 8:00-5:00                              Friday 8:00-5:00    [place] [facility] Hours:   [address] [place] [address]  [phone]    Monday 8:00-7:00  Tuesday 8:00-7:00  Wednesday 8:00-7:00  Thursday 8:00-7:00  Friday 8:00-5:00  Please log on to [email or web address] to order your contact lenses.  The link is found on the Eye Care and Vision Services page.  As always, Thank you for trusting us with your health care needs!

## 2020-12-07 ENCOUNTER — OFFICE VISIT (OUTPATIENT)
Dept: OPTOMETRY | Facility: CLINIC | Age: 70
End: 2020-12-07
Payer: COMMERCIAL

## 2020-12-07 DIAGNOSIS — Z96.1 PSEUDOPHAKIA OF BOTH EYES: Primary | ICD-10-CM

## 2020-12-07 DIAGNOSIS — H52.03 HYPERMETROPIA OF BOTH EYES: ICD-10-CM

## 2020-12-07 DIAGNOSIS — H10.13 ALLERGIC CONJUNCTIVITIS OF BOTH EYES: ICD-10-CM

## 2020-12-07 DIAGNOSIS — H52.223 REGULAR ASTIGMATISM OF BOTH EYES: ICD-10-CM

## 2020-12-07 DIAGNOSIS — H52.4 PRESBYOPIA: ICD-10-CM

## 2020-12-07 PROCEDURE — 92015 DETERMINE REFRACTIVE STATE: CPT | Performed by: OPTOMETRIST

## 2020-12-07 PROCEDURE — 92014 COMPRE OPH EXAM EST PT 1/>: CPT | Performed by: OPTOMETRIST

## 2020-12-07 ASSESSMENT — REFRACTION_WEARINGRX
OS_SPHERE: -0.25
OS_CYLINDER: +0.25
OD_AXIS: 175
OD_CYLINDER: +0.75
OD_AXIS: 175
OS_ADD: +2.50
OS_CYLINDER: +0.25
OD_SPHERE: PLANO
SPECS_TYPE: PAL
OD_CYLINDER: +0.75
OS_AXIS: 175
OD_SPHERE: PLANO
OD_ADD: +2.50
OS_AXIS: 175
OS_ADD: +2.50
OD_ADD: +2.50
OS_SPHERE: -0.25

## 2020-12-07 ASSESSMENT — TONOMETRY
OS_IOP_MMHG: 14
OD_IOP_MMHG: 13
IOP_METHOD: TONOPEN

## 2020-12-07 ASSESSMENT — VISUAL ACUITY
OD_SC: 20/20
CORRECTION_TYPE: GLASSES
METHOD: SNELLEN - LINEAR
OD_CC: 20/20
OS_CC: 20/20
OD_CC: 20/20
OD_SC+: -1
OD_CC+: -1
OS_SC: 20/20
OS_CC: 20/20

## 2020-12-07 ASSESSMENT — REFRACTION_MANIFEST
OD_CYLINDER: +0.75
OD_AXIS: 175
OD_ADD: +2.50
OS_CYLINDER: +0.25
OS_AXIS: 175
OS_SPHERE: -0.25
OD_SPHERE: PLANO
OS_ADD: +2.50

## 2020-12-07 ASSESSMENT — SLIT LAMP EXAM - LIDS
COMMENTS: DERMATOCHALASIS
COMMENTS: DERMATOCHALASIS

## 2020-12-07 ASSESSMENT — EXTERNAL EXAM - RIGHT EYE: OD_EXAM: NORMAL

## 2020-12-07 ASSESSMENT — CUP TO DISC RATIO
OD_RATIO: 0.15
OS_RATIO: 0.15

## 2020-12-07 ASSESSMENT — CONF VISUAL FIELD
OS_NORMAL: 1
OD_NORMAL: 1

## 2020-12-07 ASSESSMENT — EXTERNAL EXAM - LEFT EYE: OS_EXAM: NORMAL

## 2020-12-07 NOTE — LETTER
12/7/2020         RE: Jhonatan Crocker  [address]  [place] [address]        Dear Colleague,    Thank you for referring your patient, Jhonatan Crocker, to the [facility]. Please see a copy of my visit note below.    Chief Complaint   Patient presents with     COMPREHENSIVE EYE EXAM         Last Eye Exam: 10/30/19  Dilated Previously: Yes    What are you currently using to see?  glasses       Distance Vision Acuity: Satisfied with vision    Near Vision Acuity: Satisfied with vision while reading with glasses    Eye Comfort: good  Do you use eye drops? : No  Occupation or Hobbies: reading, tv    Odilia Lamprec, [facility]         Medical, surgical and family histories reviewed and updated 12/7/2020.       OBJECTIVE: See Ophthalmology exam    ASSESSMENT:    ICD-10-CM    1. Pseudophakia of both eyes  Z96.1 EYE EXAM (SIMPLE-NONBILLABLE)   2. Presbyopia  H52.4 REFRACTION   3. Hypermetropia of both eyes  H52.03 REFRACTION   4. Regular astigmatism of both eyes  H52.223 REFRACTION   5. Allergic conjunctivitis of both eyes  H10.13 EYE EXAM (SIMPLE-NONBILLABLE)      PLAN:     Patient Instructions   Eyeglass prescription given.  Optional change in eyeglass prescription.    OTC Pataday to be used once or twice daily for itchy eyes.  Use as needed.    Return in 1 year for a complete eye exam or sooner if needed.    Crow Huerta, EZEKIEL             Again, thank you for allowing me to participate in the care of your patient.        Sincerely,        Crow Huerta, OD

## 2020-12-07 NOTE — PATIENT INSTRUCTIONS
Eyeglass prescription given.  Optional change in eyeglass prescription.    OTC Pataday to be used once or twice daily for itchy eyes.  Use as needed.    Return in 1 year for a complete eye exam or sooner if needed.    Crow Huerta, EZEKIEL    The affects of the dilating drops last for 4- 6 hours.  You will be more sensitive to light and vision will be blurry up close.  Do not drive if you do not feel comfortable.  Mydriatic sunglasses were given if needed.      Optometry Providers       Clinic Locations                                 Telephone Number   Dr. Shelly [place] [phone]     [place] [facility] Hours:                [place] [facility] Hours:       [place] [facility] Hours:   [address]   [address]     [address] [facility] [address]  [place] [address]   [address] [address] [address]    [place] [address]  Phone: [phone]                    Phone: [phone]     Phone: [phone]                      Monday 8:00-7:00                          Monday 8:00-7:00                          Monday 8:00-7:00              Tuesday 8:00-6:00                          Tuesday 8:00-7:00                          Tuesday 8:00-7:00              Wednesday 8:00-6:00                  Wednesday 8:00-7:00                   Wednesday 8:00-7:00      Thursday 8:00-6:00                        Thursday 8:00-7:00                         Thursday 8:00-7:00            Friday 8:00-5:00                              Friday 8:00-5:00                              Friday 8:00-5:00    [place] [facility] Hours:   [address] [id number] [phone]    Monday 8:00-7:00  Tuesday 8:00-7:00  Wednesday 8:00-7:00  Thursday 8:00-7:00  Friday 8:00-5:00  Please log on to [email or web address] to order your contact lenses.  The link is found on the Eye Care and Vision Services page.  As always, Thank you for trusting us with your health care  needs!

## 2020-12-07 NOTE — PROGRESS NOTES
Chief Complaint   Patient presents with     COMPREHENSIVE EYE EXAM         Last Eye Exam: 10/30/19  Dilated Previously: Yes    What are you currently using to see?  glasses       Distance Vision Acuity: Satisfied with vision    Near Vision Acuity: Satisfied with vision while reading with glasses    Eye Comfort: good  Do you use eye drops? : No  Occupation or Hobbies: reading, tv    Odilia [facility], [facility]         Medical, surgical and family histories reviewed and updated 12/7/2020.       OBJECTIVE: See Ophthalmology exam    ASSESSMENT:    ICD-10-CM    1. Pseudophakia of both eyes  Z96.1 EYE EXAM (SIMPLE-NONBILLABLE)   2. Presbyopia  H52.4 REFRACTION   3. Hypermetropia of both eyes  H52.03 REFRACTION   4. Regular astigmatism of both eyes  H52.223 REFRACTION   5. Allergic conjunctivitis of both eyes  H10.13 EYE EXAM (SIMPLE-NONBILLABLE)      PLAN:     Patient Instructions   Eyeglass prescription given.  Optional change in eyeglass prescription.    OTC Pataday to be used once or twice daily for itchy eyes.  Use as needed.    Return in 1 year for a complete eye exam or sooner if needed.    Crow Huerta, OD

## 2021-12-08 ENCOUNTER — OFFICE VISIT (OUTPATIENT)
Dept: OPTOMETRY | Facility: CLINIC | Age: 71
End: 2021-12-08
Payer: COMMERCIAL

## 2021-12-08 DIAGNOSIS — H52.4 PRESBYOPIA: ICD-10-CM

## 2021-12-08 DIAGNOSIS — H52.223 REGULAR ASTIGMATISM OF BOTH EYES: ICD-10-CM

## 2021-12-08 DIAGNOSIS — H10.13 ALLERGIC CONJUNCTIVITIS OF BOTH EYES: ICD-10-CM

## 2021-12-08 DIAGNOSIS — Z96.1 PSEUDOPHAKIA OF BOTH EYES: Primary | ICD-10-CM

## 2021-12-08 PROCEDURE — 92015 DETERMINE REFRACTIVE STATE: CPT | Performed by: OPTOMETRIST

## 2021-12-08 PROCEDURE — 92014 COMPRE OPH EXAM EST PT 1/>: CPT | Performed by: OPTOMETRIST

## 2021-12-08 RX ORDER — OLOPATADINE HYDROCHLORIDE 2 MG/ML
1 SOLUTION/ DROPS OPHTHALMIC DAILY
Qty: 2.5 ML | Refills: 11 | Status: CANCELLED | OUTPATIENT
Start: 2021-12-08

## 2021-12-08 ASSESSMENT — TONOMETRY
OD_IOP_MMHG: 15
IOP_METHOD: TONOPEN
OS_IOP_MMHG: 13

## 2021-12-08 ASSESSMENT — VISUAL ACUITY
OS_CC: 20/20
OD_SC+: -1
OS_CC: 20/20
OS_SC+: -1
OS_SC: 20/25
OD_CC: 20/20
OD_CC: 20/20
OS_CC+: -1
OD_SC: 20/30
METHOD: SNELLEN - LINEAR

## 2021-12-08 ASSESSMENT — KERATOMETRY
OS_K1POWER_DIOPTERS: 43.75
OD_K2POWER_DIOPTERS: 44.00
OS_K2POWER_DIOPTERS: 44.00
OD_K1POWER_DIOPTERS: 43.25
OS_AXISANGLE2_DEGREES: 95
OD_AXISANGLE2_DEGREES: 98

## 2021-12-08 ASSESSMENT — SLIT LAMP EXAM - LIDS
COMMENTS: DERMATOCHALASIS
COMMENTS: DERMATOCHALASIS

## 2021-12-08 ASSESSMENT — REFRACTION_WEARINGRX
OS_AXIS: 175
OS_SPHERE: -0.25
OS_CYLINDER: +0.25
OD_AXIS: 175
SPECS_TYPE: PAL
OD_CYLINDER: +0.75
OD_SPHERE: PLANO
OS_ADD: +2.50
OD_ADD: +2.50

## 2021-12-08 ASSESSMENT — CONF VISUAL FIELD
OD_NORMAL: 1
OS_NORMAL: 1

## 2021-12-08 ASSESSMENT — REFRACTION_MANIFEST
OD_CYLINDER: +1.00
OD_SPHERE: PLANO
OD_AXIS: 175
OS_AXIS: 180
OS_ADD: +2.50
OS_CYLINDER: +0.50
OS_SPHERE: -0.25
OD_ADD: +2.50

## 2021-12-08 ASSESSMENT — PACHYMETRY
OS_CT(UM): 586
OD_CT(UM): 576

## 2021-12-08 ASSESSMENT — CUP TO DISC RATIO
OD_RATIO: 0.15
OS_RATIO: 0.15

## 2021-12-08 ASSESSMENT — EXTERNAL EXAM - LEFT EYE: OS_EXAM: NORMAL

## 2021-12-08 ASSESSMENT — EXTERNAL EXAM - RIGHT EYE: OD_EXAM: NORMAL

## 2021-12-08 NOTE — PROGRESS NOTES
Chief Complaint   Patient presents with     Annual Eye Exam         Last Eye Exam: 12/07/2020  Dilated Previously: Yes, side effects of dilation explained today    What are you currently using to see?  glasses       Distance Vision Acuity: Satisfied with vision    Near Vision Acuity: Satisfied with vision while reading and using computer with glasses    Eye Comfort: good  Do you use eye drops? : No  Occupation or Hobbies: Retired-[place] for winter    Coleman Man - Optometric Assistant          Medical, surgical and family histories reviewed and updated 12/8/2021.       OBJECTIVE: See Ophthalmology exam    ASSESSMENT:    ICD-10-CM    1. Pseudophakia of both eyes  Z96.1 EYE EXAM (SIMPLE-NONBILLABLE)   2. Allergic conjunctivitis of both eyes  H10.13 EYE EXAM (SIMPLE-NONBILLABLE)   3. Presbyopia  H52.4 REFRACTION   4. Regular astigmatism of both eyes  H52.223 REFRACTION      PLAN:     Patient Instructions   OTC Pataday to be used once or twice daily for itchy eyes.  Use as needed.    Eyeglass prescription given.  Optional change in eyeglass prescription.    Return in 1 year for a complete eye exam or sooner if needed.    Crow Huerta, OD

## 2021-12-08 NOTE — LETTER
12/8/2021         RE: Jhonatan Crocker  [address]  [place] [address]        Dear Colleague,    Thank you for referring your patient, Jhonatan Crocker, to the [facility]. Please see a copy of my visit note below.    Chief Complaint   Patient presents with     Annual Eye Exam         Last Eye Exam: 12/07/2020  Dilated Previously: Yes, side effects of dilation explained today    What are you currently using to see?  glasses       Distance Vision Acuity: Satisfied with vision    Near Vision Acuity: Satisfied with vision while reading and using computer with glasses    Eye Comfort: good  Do you use eye drops? : No  Occupation or Hobbies: Retired-[place] for winter    Coleman Magda - Optometric Assistant          Medical, surgical and family histories reviewed and updated 12/8/2021.       OBJECTIVE: See Ophthalmology exam    ASSESSMENT:    ICD-10-CM    1. Pseudophakia of both eyes  Z96.1 EYE EXAM (SIMPLE-NONBILLABLE)   2. Allergic conjunctivitis of both eyes  H10.13 EYE EXAM (SIMPLE-NONBILLABLE)   3. Presbyopia  H52.4 REFRACTION   4. Regular astigmatism of both eyes  H52.223 REFRACTION      PLAN:     Patient Instructions   OTC Pataday to be used once or twice daily for itchy eyes.  Use as needed.    Eyeglass prescription given.  Optional change in eyeglass prescription.    Return in 1 year for a complete eye exam or sooner if needed.    Crow Huerta OD           Again, thank you for allowing me to participate in the care of your patient.        Sincerely,        Crow Huerta OD

## 2021-12-08 NOTE — PATIENT INSTRUCTIONS
OTC Pataday to be used once or twice daily for itchy eyes.  Use as needed.    Eyeglass prescription given.  Optional change in eyeglass prescription.    Return in 1 year for a complete eye exam or sooner if needed.    Crow Huerta, EZEKIEL    The affects of the dilating drops last for 4- 6 hours.  You will be more sensitive to light and vision will be blurry up close.  Do not drive if you do not feel comfortable.  Mydriatic sunglasses were given if needed.      Optometry Providers       Clinic Locations                                 Telephone Number   Dr. Shelly Snow   [place]  [place]  [place] [phone]     [place] [facility] Hours:                [place] [facility] Hours:       [place] [facility] Hours:   [address]   [address]      [address] [facility]  [place] [address]   [place] [address]    [place] [address]  Phone: [phone]                    Phone: [phone]     Phone: [phone]                      Monday 8:00-7:00                          Monday 8:00-7:00                          Monday 8:00-7:00              Tuesday 8:00-6:00                          Tuesday 8:00-7:00                          Tuesday 8:00-7:00              Wednesday 8:00-6:00                  Wednesday 8:00-7:00                   Wednesday 8:00-7:00      Thursday 8:00-6:00                        Thursday 8:00-7:00                         Thursday 8:00-7:00            Friday 8:00-5:00                              Friday 8:00-5:00                              Friday 8:00-5:00    [place] [facility] Hours:   [address] [facility] [address] [address] [address] [id number] [phone]    Monday 8:00-7:00  Tuesday 8:00-7:00  Wednesday 8:00-7:00  Thursday 8:00-7:00  Friday 8:00-5:00  Please log on to [email or web address] to order your contact lenses.  The link is found on the Eye Care and Vision Services page.  As always, Thank you for trusting  us with your health care needs!

## 2022-07-13 ENCOUNTER — OFFICE VISIT (OUTPATIENT)
Dept: OPTOMETRY | Facility: CLINIC | Age: 72
End: 2022-07-13
Payer: COMMERCIAL

## 2022-07-13 DIAGNOSIS — H52.4 PRESBYOPIA: ICD-10-CM

## 2022-07-13 DIAGNOSIS — H52.223 REGULAR ASTIGMATISM OF BOTH EYES: ICD-10-CM

## 2022-07-13 DIAGNOSIS — Z83.518 FAMILY HISTORY OF MACULAR DEGENERATION: ICD-10-CM

## 2022-07-13 DIAGNOSIS — Z96.1 PSEUDOPHAKIA OF BOTH EYES: Primary | ICD-10-CM

## 2022-07-13 PROCEDURE — 92014 COMPRE OPH EXAM EST PT 1/>: CPT | Performed by: OPTOMETRIST

## 2022-07-13 PROCEDURE — 92015 DETERMINE REFRACTIVE STATE: CPT | Performed by: OPTOMETRIST

## 2022-07-13 ASSESSMENT — VISUAL ACUITY
OS_CC: 20/25
OS_CC: 20/20
OS_SC: 20/20
OD_CC: 20/20
OD_CC+: -2
OD_SC+: -3
OS_CC+: -2
METHOD: SNELLEN - LINEAR
CORRECTION_TYPE: GLASSES
OD_CC: 20/20
OD_SC: 20/20

## 2022-07-13 ASSESSMENT — REFRACTION_MANIFEST
OD_CYLINDER: +0.75
OD_ADD: +2.50
METHOD_AUTOREFRACTION: 1
OS_AXIS: 175
OS_ADD: +2.50
OD_SPHERE: PLANO
OS_SPHERE: -0.25
OD_AXIS: 175
OS_CYLINDER: +0.25

## 2022-07-13 ASSESSMENT — REFRACTION_WEARINGRX
OD_ADD: +2.50
OS_SPHERE: -0.25
OD_ADD: +2.50
SPECS_TYPE: PAL
OS_SPHERE: -0.25
OS_CYLINDER: +0.25
SPECS_TYPE: PAL
OD_SPHERE: PLANO
OD_CYLINDER: +0.75
OS_CYLINDER: +0.25
OS_ADD: +2.50
OS_ADD: +2.50
OD_CYLINDER: +0.75
OS_AXIS: 175
OS_AXIS: 175
OD_AXIS: 175
OD_SPHERE: PLANO
OD_AXIS: 175

## 2022-07-13 ASSESSMENT — TONOMETRY
IOP_METHOD: TONOPEN
OS_IOP_MMHG: 11
OD_IOP_MMHG: 10

## 2022-07-13 ASSESSMENT — CUP TO DISC RATIO
OD_RATIO: 0.15
OS_RATIO: 0.15

## 2022-07-13 ASSESSMENT — SLIT LAMP EXAM - LIDS
COMMENTS: DERMATOCHALASIS
COMMENTS: DERMATOCHALASIS

## 2022-07-13 ASSESSMENT — KERATOMETRY
OD_K1POWER_DIOPTERS: 43.25
OS_K1POWER_DIOPTERS: 44.00
OD_AXISANGLE2_DEGREES: 091
OS_AXISANGLE_DEGREES: 090
OD_K2POWER_DIOPTERS: 44.00
OD_AXISANGLE_DEGREES: 001
OS_AXISANGLE2_DEGREES: 180
OS_K2POWER_DIOPTERS: 44.00

## 2022-07-13 ASSESSMENT — EXTERNAL EXAM - LEFT EYE: OS_EXAM: NORMAL

## 2022-07-13 ASSESSMENT — CONF VISUAL FIELD
OS_NORMAL: 1
OD_NORMAL: 1

## 2022-07-13 ASSESSMENT — EXTERNAL EXAM - RIGHT EYE: OD_EXAM: NORMAL

## 2022-07-13 NOTE — LETTER
7/13/2022         RE: Jhonatan Crocker  [address]  [place] [address]        Dear Colleague,    Thank you for referring your patient, Jhonatan Crocker, to the [facility]. Please see a copy of my visit note below.    Chief Complaint   Patient presents with     Annual Eye Exam      Accompanied by self  Last Eye Exam: 12-8-2021 insurance said ok per patient   Dilated Previously: Yes    What are you currently using to see?  glasses       Distance Vision Acuity: Satisfied with vision    Near Vision Acuity: Satisfied with vision while reading  with glasses    Eye Comfort: good- had to use OTC generic Pataday 1 day and that worked well for allergies  Do you use eye drops? : No  Occupation or Hobbies: retired     Cherie Mederos Optometric Assistant, A.B.O.C.      Medical, surgical and family histories reviewed and updated 7/13/2022.       OBJECTIVE: See Ophthalmology exam    ASSESSMENT:    ICD-10-CM    1. Pseudophakia of both eyes  Z96.1    2. Family history of macular degeneration  Z83.518    3. Presbyopia  H52.4    4. Regular astigmatism of both eyes  H52.223        PLAN:     Patient Instructions   Good nutrition is recommended.  Kale, spinach, broccoli, squash and other vegetables have high levels of antioxidants, including lutein and zeaxanthin which is important if you have macular degeneration.   Foods containing high levels of zinc are also important.  These include foods high in protein such as beef, pork and lamb. Nonmeat sources include yogurt, milk, cheese, whole-grain cereals and whole wheat bread.    Eyeglass prescription given.  Optional change in eyeglass prescription.  Recommend back up pair of glasses.    Return in 1 year for a complete eye exam or sooner if needed.    Crow Huerta, OD               Again, thank you for allowing me to participate in the care of your patient.        Sincerely,        Crow Huerta, OD

## 2022-07-13 NOTE — PROGRESS NOTES
Chief Complaint   Patient presents with     Annual Eye Exam      Accompanied by self  Last Eye Exam: 12-8-2021 insurance said ok per patient   Dilated Previously: Yes    What are you currently using to see?  glasses       Distance Vision Acuity: Satisfied with vision    Near Vision Acuity: Satisfied with vision while reading  with glasses    Eye Comfort: good- had to use OTC generic Pataday 1 day and that worked well for allergies  Do you use eye drops? : No  Occupation or Hobbies: retired     Cherie Mederos Optometric Assistant, A.B.O.C.      Medical, surgical and family histories reviewed and updated 7/13/2022.       OBJECTIVE: See Ophthalmology exam    ASSESSMENT:    ICD-10-CM    1. Pseudophakia of both eyes  Z96.1    2. Family history of macular degeneration  Z83.518    3. Presbyopia  H52.4    4. Regular astigmatism of both eyes  H52.223        PLAN:     Patient Instructions   Good nutrition is recommended.  Kale, spinach, broccoli, squash and other vegetables have high levels of antioxidants, including lutein and zeaxanthin which is important if you have macular degeneration.   Foods containing high levels of zinc are also important.  These include foods high in protein such as beef, pork and lamb. Nonmeat sources include yogurt, milk, cheese, whole-grain cereals and whole wheat bread.    Eyeglass prescription given.  Optional change in eyeglass prescription.  Recommend back up pair of glasses.    Return in 1 year for a complete eye exam or sooner if needed.    Crow Huerta, OD

## 2022-07-13 NOTE — PATIENT INSTRUCTIONS
Good nutrition is recommended.  Kale, spinach, broccoli, squash and other vegetables have high levels of antioxidants, including lutein and zeaxanthin which is important if you have macular degeneration.   Foods containing high levels of zinc are also important.  These include foods high in protein such as beef, pork and lamb. Nonmeat sources include yogurt, milk, cheese, whole-grain cereals and whole wheat bread.    Eyeglass prescription given.  Optional change in eyeglass prescription.  Recommend back up pair of glasses.    Return in 1 year for a complete eye exam or sooner if needed.    Crow Huerta, OD    The affects of the dilating drops last for 4- 6 hours.  You will be more sensitive to light and vision will be blurry up close.  Do not drive if you do not feel comfortable.  Mydriatic sunglasses were given if needed.      Optometry Providers       Clinic Locations                                 Telephone Number   Dr. Shelly Snow   [place]  [place]  [place] [phone]     [place] [facility] Hours:                [place] [facility] Hours:       [place] [facility] Hours:   [address]   [address]     [address] [place] [address]   [place] [address]    [place] [address]  Phone: [phone]                    Phone: [phone]     Phone: [phone]                      Monday 8:00-6:00                          Monday 8:00-6:00                          Monday 8:00-6:00              Tuesday 8:00-6:00                          Tuesday 8:00-6:00                          Tuesday 8:00-6:00              Wednesday 8:00-6:00                  Wednesday 8:00-6:00                   Wednesday 8:00-6:00      Thursday 8:00-6:00                        Thursday 8:00-6:00                         Thursday 8:00-6:00            Friday 8:00-5:00                              Friday 8:00-5:00                               Friday 8:00-5:00    [place] [facility] Hours:   [address] [place] [address]  [phone]    Monday 9:00-6:00  Tuesday 9:00-6:00  Wednesday 9:00-6:00  Thursday 9:00-6:00  Friday 9:00-5:00  Please log on to [email or web address] to order your contact lenses.  The link is found on the Eye Care and Vision Services page.  As always, Thank you for trusting us with your health care needs!

## 2023-07-19 ENCOUNTER — OFFICE VISIT (OUTPATIENT)
Dept: OPTOMETRY | Facility: CLINIC | Age: 73
End: 2023-07-19
Payer: COMMERCIAL

## 2023-07-19 DIAGNOSIS — H01.02B SQUAMOUS BLEPHARITIS OF UPPER AND LOWER EYELIDS OF BOTH EYES: ICD-10-CM

## 2023-07-19 DIAGNOSIS — H10.13 ALLERGIC CONJUNCTIVITIS OF BOTH EYES: ICD-10-CM

## 2023-07-19 DIAGNOSIS — Z83.518 FAMILY HISTORY OF MACULAR DEGENERATION: ICD-10-CM

## 2023-07-19 DIAGNOSIS — H01.02A SQUAMOUS BLEPHARITIS OF UPPER AND LOWER EYELIDS OF BOTH EYES: ICD-10-CM

## 2023-07-19 DIAGNOSIS — H52.223 REGULAR ASTIGMATISM OF BOTH EYES: ICD-10-CM

## 2023-07-19 DIAGNOSIS — Z96.1 PSEUDOPHAKIA OF BOTH EYES: Primary | ICD-10-CM

## 2023-07-19 DIAGNOSIS — H52.4 PRESBYOPIA: ICD-10-CM

## 2023-07-19 PROCEDURE — 92015 DETERMINE REFRACTIVE STATE: CPT | Performed by: OPTOMETRIST

## 2023-07-19 PROCEDURE — 92014 COMPRE OPH EXAM EST PT 1/>: CPT | Performed by: OPTOMETRIST

## 2023-07-19 ASSESSMENT — KERATOMETRY
OS_AXISANGLE_DEGREES: 009
OD_K2POWER_DIOPTERS: 44.00
OS_AXISANGLE2_DEGREES: 099
OS_K2POWER_DIOPTERS: 44.00
OD_AXISANGLE2_DEGREES: 100
OS_K1POWER_DIOPTERS: 43.50
OD_AXISANGLE_DEGREES: 010
OD_K1POWER_DIOPTERS: 43.00

## 2023-07-19 ASSESSMENT — VISUAL ACUITY
OS_CC: 20/20
CORRECTION_TYPE: GLASSES
OS_SC+: --1
OD_SC: 20/25
OD_CC+: -1
OD_SC+: -1
OS_CC: 20/25
OD_CC: 20/20
METHOD: SNELLEN - LINEAR
OD_CC: 20/20-1
OS_CC+: -2
OS_SC: 20/20

## 2023-07-19 ASSESSMENT — PACHYMETRY
OS_CT(UM): 586
OD_CT(UM): 576

## 2023-07-19 ASSESSMENT — SLIT LAMP EXAM - LIDS
COMMENTS: DERMATOCHALASIS, BLEPHARITIS
COMMENTS: DERMATOCHALASIS, BLEPHARITIS

## 2023-07-19 ASSESSMENT — TONOMETRY
IOP_METHOD: TONOPEN
OS_IOP_MMHG: 14
OD_IOP_MMHG: 15

## 2023-07-19 ASSESSMENT — CONF VISUAL FIELD
OD_SUPERIOR_NASAL_RESTRICTION: 0
OS_SUPERIOR_TEMPORAL_RESTRICTION: 0
OS_INFERIOR_TEMPORAL_RESTRICTION: 0
OS_SUPERIOR_NASAL_RESTRICTION: 0
OD_NORMAL: 1
OD_SUPERIOR_TEMPORAL_RESTRICTION: 0
OD_INFERIOR_NASAL_RESTRICTION: 0
OS_INFERIOR_NASAL_RESTRICTION: 0
OS_NORMAL: 1
OD_INFERIOR_TEMPORAL_RESTRICTION: 0

## 2023-07-19 ASSESSMENT — REFRACTION_WEARINGRX
SPECS_TYPE: PAL
OS_AXIS: 175
OS_ADD: +2.50
OS_SPHERE: -0.25
OD_CYLINDER: +0.75
OD_SPHERE: PLANO
OD_AXIS: 175
OS_CYLINDER: +0.25
OD_ADD: +2.50

## 2023-07-19 ASSESSMENT — EXTERNAL EXAM - LEFT EYE: OS_EXAM: NORMAL

## 2023-07-19 ASSESSMENT — REFRACTION_MANIFEST
OS_CYLINDER: +0.75
OS_SPHERE: -0.25
OS_AXIS: 175
OS_ADD: +2.50
OD_CYLINDER: +0.75
OD_ADD: +2.50
OD_AXIS: 175
OD_SPHERE: PLANO

## 2023-07-19 ASSESSMENT — CUP TO DISC RATIO
OS_RATIO: 0.15
OD_RATIO: 0.15

## 2023-07-19 ASSESSMENT — EXTERNAL EXAM - RIGHT EYE: OD_EXAM: NORMAL

## 2023-07-19 NOTE — PATIENT INSTRUCTIONS
Eyeglass prescription given.  Optional change in eyeglass prescription.    Good nutrition is recommended.  Kale, spinach, broccoli, squash and other vegetables have high levels of antioxidants, including lutein and zeaxanthin which is important if you have macular degeneration.   Foods containing high levels of zinc are also important.  These include foods high in protein such as beef, pork and lamb. Nonmeat sources include yogurt, milk, cheese, whole-grain cereals and whole wheat bread.     OTC Pataday or Lastacaft to be used once daily for itchy eyes.  Use as needed.     Ocusoft Hypochlor- spray solution onto cotton pad.  Close eyes and gently apply to eyelids and eyelashes using side to side motion.  Use morning and evening. Or Ocusoft lid scrubs at night.     Return in 1 year for a complete eye exam or sooner if needed.    Crow Huerta, OD

## 2023-07-19 NOTE — PROGRESS NOTES
Chief Complaint   Patient presents with     Annual Eye Exam         Last Eye Exam: 7-  Dilated Previously: Yes    What are you currently using to see?  glasses       Distance Vision Acuity: Satisfied with vision    Near Vision Acuity: Satisfied with vision while reading  with glasses    Eye Comfort: good  Do you use eye drops? : No  Occupation or Hobbies: retired      Cherie Mederos Optometric Assistant, AAllysonBAllysonOAllysonC.      Medical, surgical and family histories reviewed and updated 7/19/2023.       OBJECTIVE: See Ophthalmology exam    ASSESSMENT:    ICD-10-CM    1. Pseudophakia of both eyes  Z96.1 EYE EXAM (SIMPLE-NONBILLABLE)      2. Regular astigmatism of both eyes  H52.223 REFRACTION      3. Presbyopia  H52.4 REFRACTION      4. Family history of macular degeneration  Z83.518 EYE EXAM (SIMPLE-NONBILLABLE)      5. Allergic conjunctivitis of both eyes  H10.13 EYE EXAM (SIMPLE-NONBILLABLE)      6. Squamous blepharitis of upper and lower eyelids of both eyes  H01.02A EYE EXAM (SIMPLE-NONBILLABLE)    H01.02B           PLAN:     Patient Instructions   Eyeglass prescription given.  Optional change in eyeglass prescription.    Good nutrition is recommended.  Kale, spinach, broccoli, squash and other vegetables have high levels of antioxidants, including lutein and zeaxanthin which is important if you have macular degeneration.   Foods containing high levels of zinc are also important.  These include foods high in protein such as beef, pork and lamb. Nonmeat sources include yogurt, milk, cheese, whole-grain cereals and whole wheat bread.     OTC Pataday or Lastacaft to be used once daily for itchy eyes.  Use as needed.     Ocusoft Hypochlor- spray solution onto cotton pad.  Close eyes and gently apply to eyelids and eyelashes using side to side motion.  Use morning and evening. Or Ocusoft lid scrubs at night.     Return in 1 year for a complete eye exam or sooner if needed.    Crow Huerta, OD

## 2023-07-19 NOTE — LETTER
7/19/2023         RE: Jhonatan Crocker  [address]  [place] [address]        Dear Colleague,    Thank you for referring your patient, Jhonatan Crocker, to the [facility] [place] [facility]. Please see a copy of my visit note below.    Chief Complaint   Patient presents with     Annual Eye Exam         Last Eye Exam: 7-  Dilated Previously: Yes    What are you currently using to see?  glasses       Distance Vision Acuity: Satisfied with vision    Near Vision Acuity: Satisfied with vision while reading  with glasses    Eye Comfort: good  Do you use eye drops? : No  Occupation or Hobbies: retired      Cherie Mederos Optometric Assistant, AAllysonB.O.C.      Medical, surgical and family histories reviewed and updated 7/19/2023.       OBJECTIVE: See Ophthalmology exam    ASSESSMENT:    ICD-10-CM    1. Pseudophakia of both eyes  Z96.1 EYE EXAM (SIMPLE-NONBILLABLE)      2. Regular astigmatism of both eyes  H52.223 REFRACTION      3. Presbyopia  H52.4 REFRACTION      4. Family history of macular degeneration  Z83.518 EYE EXAM (SIMPLE-NONBILLABLE)      5. Allergic conjunctivitis of both eyes  H10.13 EYE EXAM (SIMPLE-NONBILLABLE)      6. Squamous blepharitis of upper and lower eyelids of both eyes  H01.02A EYE EXAM (SIMPLE-NONBILLABLE)    H01.02B           PLAN:     Patient Instructions   Eyeglass prescription given.  Optional change in eyeglass prescription.    Good nutrition is recommended.  Kale, spinach, broccoli, squash and other vegetables have high levels of antioxidants, including lutein and zeaxanthin which is important if you have macular degeneration.   Foods containing high levels of zinc are also important.  These include foods high in protein such as beef, pork and lamb. Nonmeat sources include yogurt, milk, cheese, whole-grain cereals and whole wheat bread.     OTC Pataday or Lastacaft to be used once daily for itchy eyes.  Use as needed.     Ocusoft Hypochlor- spray solution onto cotton  pad.  Close eyes and gently apply to eyelids and eyelashes using side to side motion.  Use morning and evening. Or Ocusoft lid scrubs at night.     Return in 1 year for a complete eye exam or sooner if needed.    Crow Huerta, OD           Again, thank you for allowing me to participate in the care of your patient.        Sincerely,        Crow Huerta, OD

## 2024-08-01 ENCOUNTER — OFFICE VISIT (OUTPATIENT)
Dept: OPTOMETRY | Facility: CLINIC | Age: 74
End: 2024-08-01
Payer: COMMERCIAL

## 2024-08-01 DIAGNOSIS — H52.4 PRESBYOPIA: ICD-10-CM

## 2024-08-01 DIAGNOSIS — Z01.00 EXAMINATION OF EYES AND VISION: Primary | ICD-10-CM

## 2024-08-01 DIAGNOSIS — H52.223 REGULAR ASTIGMATISM OF BOTH EYES: ICD-10-CM

## 2024-08-01 DIAGNOSIS — Z83.518 FAMILY HISTORY OF MACULAR DEGENERATION: ICD-10-CM

## 2024-08-01 DIAGNOSIS — Z96.1 PSEUDOPHAKIA OF BOTH EYES: ICD-10-CM

## 2024-08-01 PROCEDURE — 92014 COMPRE OPH EXAM EST PT 1/>: CPT | Performed by: OPTOMETRIST

## 2024-08-01 PROCEDURE — 92015 DETERMINE REFRACTIVE STATE: CPT | Performed by: OPTOMETRIST

## 2024-08-01 ASSESSMENT — VISUAL ACUITY
OS_CC: 20/20
OD_CC: 20/20
OS_CC: 20/20
CORRECTION_TYPE: GLASSES
OS_CC+: -1
OD_CC+: -2
OD_CC: 20/20
METHOD: SNELLEN - LINEAR

## 2024-08-01 ASSESSMENT — EXTERNAL EXAM - RIGHT EYE: OD_EXAM: NORMAL

## 2024-08-01 ASSESSMENT — KERATOMETRY
OD_K2POWER_DIOPTERS: 44.25
OS_K2POWER_DIOPTERS: 44.25
OD_K1POWER_DIOPTERS: 43.00
OD_AXISANGLE2_DEGREES: 93
OS_AXISANGLE2_DEGREES: 95
OD_AXISANGLE_DEGREES: 3
OS_K1POWER_DIOPTERS: 43.75
OS_AXISANGLE_DEGREES: 5

## 2024-08-01 ASSESSMENT — REFRACTION_WEARINGRX
OD_CYLINDER: +0.75
OS_AXIS: 175
OS_CYLINDER: +0.25
OD_AXIS: 175
SPECS_TYPE: PAL
OS_ADD: +2.50
OS_SPHERE: -0.25
OD_ADD: +2.50
OD_SPHERE: PLANO

## 2024-08-01 ASSESSMENT — CONF VISUAL FIELD
OS_NORMAL: 1
OS_INFERIOR_NASAL_RESTRICTION: 0
OD_NORMAL: 1
OS_SUPERIOR_NASAL_RESTRICTION: 0
OD_INFERIOR_TEMPORAL_RESTRICTION: 0
OS_INFERIOR_TEMPORAL_RESTRICTION: 0
OD_SUPERIOR_NASAL_RESTRICTION: 0
OD_SUPERIOR_TEMPORAL_RESTRICTION: 0
OS_SUPERIOR_TEMPORAL_RESTRICTION: 0
OD_INFERIOR_NASAL_RESTRICTION: 0

## 2024-08-01 ASSESSMENT — REFRACTION_MANIFEST
OS_AXIS: 175
OD_CYLINDER: +0.75
OD_SPHERE: PLANO
OD_AXIS: 175
OD_ADD: +2.50
OS_ADD: +2.50
OS_CYLINDER: +0.25
OS_SPHERE: -0.25

## 2024-08-01 ASSESSMENT — SLIT LAMP EXAM - LIDS
COMMENTS: DERMATOCHALASIS, BLEPHARITIS
COMMENTS: DERMATOCHALASIS, BLEPHARITIS

## 2024-08-01 ASSESSMENT — CUP TO DISC RATIO
OD_RATIO: 0.15
OS_RATIO: 0.15

## 2024-08-01 ASSESSMENT — PACHYMETRY
OD_CT(UM): 576
OS_CT(UM): 586

## 2024-08-01 ASSESSMENT — TONOMETRY
OD_IOP_MMHG: 10
OS_IOP_MMHG: 12
IOP_METHOD: TONOPEN

## 2024-08-01 ASSESSMENT — EXTERNAL EXAM - LEFT EYE: OS_EXAM: NORMAL

## 2024-08-01 NOTE — LETTER
8/1/2024      Jhonatan Crocker  [address]  [place] [address]      Dear Colleague,    Thank you for referring your patient, Jhonatan Crocker, to the [facility]. Please see a copy of my visit note below.    Chief Complaint   Patient presents with     Annual Eye Exam      Accompanied by wife  Last Eye Exam: 2023  Dilated Previously: Yes    What are you currently using to see?  glasses       Distance Vision Acuity: Satisfied with vision    Near Vision Acuity: Satisfied with vision while reading and using computer with glasses    Eye Comfort: good  Do you use eye drops? 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       OBJECTIVE: See Ophthalmology exam    ASSESSMENT:    ICD-10-CM    1. Pseudophakia of both eyes  Z96.1       2. Regular astigmatism of both eyes  H52.223       3. Presbyopia  H52.4       4. Family history of macular degeneration  Z83.518           PLAN:     Patient Instructions   Eyeglass prescription given.  No change in eyeglass prescription.    Good nutrition is recommended.  Kale, spinach, broccoli, squash and other vegetables have high levels of antioxidants, including lutein and zeaxanthin which is important if you have macular degeneration.   Foods containing high levels of zinc are also important.  These include foods high in protein such as beef, pork and lamb. Nonmeat sources include yogurt, milk, cheese, whole-grain cereals and whole wheat bread.    Return in 1 year for a complete eye exam or sooner if needed.    Crow Huerta,  OD         Again, thank you for allowing me to participate in the care of your patient.        Sincerely,        Crow Huerta OD

## 2024-08-01 NOTE — PROGRESS NOTES
Chief Complaint   Patient presents with    Annual Eye Exam    History of cataract surgery- check implants  Accompanied by wife  Last Eye Exam: 2023  Dilated Previously: Yes    What are you currently using to see?  glasses       Distance Vision Acuity: Satisfied with vision    Near Vision Acuity: Satisfied with vision while reading and using computer with glasses    Eye Comfort: good  Do you use eye drops? 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       OBJECTIVE: See Ophthalmology exam    ASSESSMENT:    ICD-10-CM    1. Pseudophakia of both eyes  Z96.1       2. Regular astigmatism of both eyes  H52.223       3. Presbyopia  H52.4       4. Family history of macular degeneration  Z83.518           PLAN:     Patient Instructions   Eyeglass prescription given.  No change in eyeglass prescription.    Good nutrition is recommended.  Kale, spinach, broccoli, squash and other vegetables have high levels of antioxidants, including lutein and zeaxanthin which is important if you have macular degeneration.   Foods containing high levels of zinc are also important.  These include foods high in protein such as beef, pork and lamb. Nonmeat sources include yogurt, milk, cheese, whole-grain cereals and whole wheat bread.    Return in 1 year for a complete eye exam or sooner if needed.    Crow Huerta, OD

## 2024-08-01 NOTE — PATIENT INSTRUCTIONS
Eyeglass prescription given.  No change in eyeglass prescription.    Good nutrition is recommended.  Kale, spinach, broccoli, squash and other vegetables have high levels of antioxidants, including lutein and zeaxanthin which is important if you have macular degeneration.   Foods containing high levels of zinc are also important.  These include foods high in protein such as beef, pork and lamb. Nonmeat sources include yogurt, milk, cheese, whole-grain cereals and whole wheat bread.    Return in 1 year for a complete eye exam or sooner if needed.    Crow Huerta, OD    The affects of the dilating drops last for 4- 6 hours.  You will be more sensitive to light and vision will be blurry up close.  Do not drive if you do not feel comfortable.  Mydriatic sunglasses were given if needed.      Optometry Providers       Clinic Locations                                 Telephone Number   Dr. Shelly Miranda [facility]    [facility] [phone]     [facility] Hours:                [facility] Hours:       [facility] Hours:   [address]   [address]      [address] [place] [address]   [place] [address]    [place] [address]  Phone: [phone]                    Phone: [phone]     Phone: [phone]                      Monday 8:00-6:00                          Monday 8:00-6:00 Monday 8:00-6:00              Tuesday 8:00-6:00 Tuesday 8:00-6:00                          Tuesday 8:00-6:00              Wednesday 8:00-6:00                  Wednesday 8:00-6:00                   Wednesday 8:00-6:00      Thursday 8:00-6:00                        Thursday 8:00-6:00                         Thursday 8:00-6:00            Friday 8:00-5:00                               Friday 8:00-5:00                              Friday 8:00-5:00    [facility] Hours:   [address] [place], [place] [address] [phone]    Monday 9:00-6:00  Tuesday 9:00-6:00  Wednesday 9:00-6:00  Thursday 9:00-6:00  Friday 9:00-5:00  As always, Thank you for trusting us with your health care needs!

## 2025-05-02 NOTE — MR AVS SNAPSHOT
After Visit Summary   10/12/2018    Jhonatan Crocker    MRN: [id number]           Patient Information     Date Of Birth          1950        Visit Information        Provider Department      10/12/2018 11:20 AM Crow Huerta OD [facility]        Today's Diagnoses     Pseudophakia of left eye    -  1      Care Instructions    Continue with drops and scheduled follow up appointments.  Follow directions on your bottles as far as how many drops to use daily.    Wear shield while sleeping.       Please continue any glaucoma, dry eye, or other medications you were using prior to the surgery.        Crow Huerta OD  [facility]  [address] [place] [address]  Tel- [phone]  Jsa-[phone]            Follow-ups after your visit        Your next 10 appointments already scheduled     Oct 17, 2018 11:40 AM CDT   Return Visit with Crow Huerta OD   [facility]    [address] [facility] [address] [phone]              Who to contact     If you have questions or need follow up information about today's clinic visit or your schedule please contact [facility] directly at [phone].  Normal or non-critical lab and imaging results will be communicated to you by [employer]hart, letter or phone within 4 business days after the clinic has received the results. If you do not hear from us within 7 days, please contact the clinic through [employer] or phone. If you have a critical or abnormal lab result, we will notify you by phone as soon as possible.  Submit refill requests through HubChilla or call your pharmacy and they will forward the refill request to us. Please allow 3 business days for your refill to be completed.          Additional Information About Your Visit        [email or web address] Information     HubChilla is an electronic gateway that provides easy, online access to your medical records.  With [employer], you can request a clinic appointment, read your test results, renew a prescription or communicate with your care team.     To sign up for [employer]ell visit the website at [email or web address]   You will be asked to enter the access code listed below, as well as some personal information. Please follow the directions to create your username and password.     Your access code is: [email or web address]  Expires: 2018  8:03 AM     Your access code will  in 90 days. If you need help or a new code, please contact your [facility] or call [phone] for assistance.        Care EveryWhere ID     This is your Care EveryWhere ID. This could be used by other organizations to access your [place] medical records  [id number]         Blood Pressure from Last 3 Encounters:   10/11/18 128/75   18 140/77    Weight from Last 3 Encounters:   18 91.7 kg (202 lb 2.6 oz)              We Performed the Following     POST-OP FOLLOW-UP VISIT        Primary Care Provider Office Phone # Fax #    Randy Allred [phone]       [address] [address]        Equal Access to Services     ADILENE HURTADO : Hadii aad ku hadasho Soomaali, waaxda luqadaha, qaybta kaalmada adeegyada, waxay idiin haytangn dillon marte lahuber darnell. So [facility] [phone].    ATENCIÓN: Si habla español, tiene a oliver disposición servicios gratuitos de asistencia lingüística. Llame al [phone].    We comply with applicable federal civil rights laws and [place] laws. We do not discriminate on the basis of race, color, national origin, age, disability, sex, sexual orientation, or gender identity.            Thank you!     Thank you for choosing [facility]  for your care. Our goal is always to provide you with excellent care. Hearing back from our patients is one way we can continue to improve our services. Please take a few minutes to complete the  written survey that you may receive in the mail after your visit with us. Thank you!             Your Updated Medication List - Protect others around you: Learn how to safely use, store and throw away your medicines at www.disposemymeds.org.          This list is accurate as of 10/12/18 11:30 AM.  Always use your most recent med list.                   Brand Name Dispense Instructions for use Diagnosis    aspirin 325 MG tablet      Take by mouth daily        ICAPS MV PO           ketorolac 0.5 % ophthalmic solution    ACULAR    1 Bottle    Apply 1 drop to eye 4 times daily Start 2 days prior to surgery in operative eye.    Age-related nuclear cataract of both eyes       METOPROLOL TARTRATE PO      Take 50 mg by mouth daily        prednisoLONE acetate 1 % ophthalmic susp    PRED FORTE    1 Bottle    Apply 1 drop to eye 4 times daily Start 2 days prior to surgery in operative eye.    Age-related nuclear cataract of both eyes          30

## 2025-07-02 ENCOUNTER — OFFICE VISIT (OUTPATIENT)
Dept: OPTOMETRY | Facility: CLINIC | Age: 75
End: 2025-07-02
Payer: COMMERCIAL

## 2025-07-02 DIAGNOSIS — Z01.00 EXAMINATION OF EYES AND VISION: Primary | ICD-10-CM

## 2025-07-02 DIAGNOSIS — H52.223 REGULAR ASTIGMATISM OF BOTH EYES: ICD-10-CM

## 2025-07-02 DIAGNOSIS — Z83.518 FAMILY HISTORY OF MACULAR DEGENERATION: ICD-10-CM

## 2025-07-02 DIAGNOSIS — H10.13 ALLERGIC CONJUNCTIVITIS OF BOTH EYES: ICD-10-CM

## 2025-07-02 DIAGNOSIS — Z96.1 PSEUDOPHAKIA OF BOTH EYES: ICD-10-CM

## 2025-07-02 DIAGNOSIS — H52.4 PRESBYOPIA: ICD-10-CM

## 2025-07-02 PROCEDURE — 92015 DETERMINE REFRACTIVE STATE: CPT | Performed by: OPTOMETRIST

## 2025-07-02 PROCEDURE — 92014 COMPRE OPH EXAM EST PT 1/>: CPT | Performed by: OPTOMETRIST

## 2025-07-02 ASSESSMENT — CONF VISUAL FIELD
OD_NORMAL: 1
OD_INFERIOR_TEMPORAL_RESTRICTION: 0
OS_INFERIOR_TEMPORAL_RESTRICTION: 0
OD_INFERIOR_NASAL_RESTRICTION: 0
OS_NORMAL: 1
OS_SUPERIOR_NASAL_RESTRICTION: 0
OS_SUPERIOR_TEMPORAL_RESTRICTION: 0
OS_INFERIOR_NASAL_RESTRICTION: 0
OD_SUPERIOR_TEMPORAL_RESTRICTION: 0
OD_SUPERIOR_NASAL_RESTRICTION: 0

## 2025-07-02 ASSESSMENT — SLIT LAMP EXAM - LIDS
COMMENTS: DERMATOCHALASIS, BLEPHARITIS
COMMENTS: DERMATOCHALASIS, BLEPHARITIS

## 2025-07-02 ASSESSMENT — PACHYMETRY
OS_CT(UM): 586
OD_CT(UM): 576

## 2025-07-02 ASSESSMENT — VISUAL ACUITY
CORRECTION_TYPE: GLASSES
METHOD: SNELLEN - LINEAR
OD_CC+: -1
OD_CC: 20/25
OD_SC: 20/40
OS_SC+: -2
OD_CC: 20/20
OS_CC: 20/20
OS_SC: 20/20
OS_CC: 20/20

## 2025-07-02 ASSESSMENT — TONOMETRY
OD_IOP_MMHG: 16.3
IOP_METHOD: ICARE
OS_IOP_MMHG: 15.1

## 2025-07-02 ASSESSMENT — EXTERNAL EXAM - RIGHT EYE: OD_EXAM: NORMAL

## 2025-07-02 ASSESSMENT — REFRACTION_MANIFEST
OS_AXIS: 175
OS_ADD: +2.50
OD_CYLINDER: +0.75
OD_SPHERE: PLANO
OS_CYLINDER: +0.25
OD_ADD: +2.50
OS_SPHERE: -0.25
OD_AXIS: 175

## 2025-07-02 ASSESSMENT — REFRACTION_WEARINGRX
OD_AXIS: 175
OD_CYLINDER: +0.75
OS_SPHERE: -0.25
SPECS_TYPE: PAL
OS_AXIS: 175
OD_ADD: +2.50
OS_CYLINDER: +0.25
OD_SPHERE: PLANO
OS_ADD: +2.50

## 2025-07-02 ASSESSMENT — KERATOMETRY
OD_K2POWER_DIOPTERS: 44.50
OS_AXISANGLE2_DEGREES: 079
OD_K1POWER_DIOPTERS: 43.25
OD_AXISANGLE_DEGREES: 004
OS_AXISANGLE_DEGREES: 169
OS_K1POWER_DIOPTERS: 43.75
OS_K2POWER_DIOPTERS: 44.25
OD_AXISANGLE2_DEGREES: 094

## 2025-07-02 ASSESSMENT — EXTERNAL EXAM - LEFT EYE: OS_EXAM: NORMAL

## 2025-07-02 ASSESSMENT — CUP TO DISC RATIO
OS_RATIO: 0.15
OD_RATIO: 0.15

## 2025-07-02 NOTE — PATIENT INSTRUCTIONS
Eyeglass prescription given.  No change in eyeglass prescription.     Good nutrition is recommended.  Kale, spinach, broccoli, squash and other vegetables have high levels of antioxidants, including lutein and zeaxanthin which is important if you have macular degeneration.   Foods containing high levels of zinc are also important.  These include foods high in protein such as beef, pork and lamb. Nonmeat sources include yogurt, milk, cheese, whole-grain cereals and whole wheat bread.    OTC Pataday or Lastacaft to be used once daily for itchy eyes.  Use as needed.     Return in 1 year for a complete eye exam or sooner if needed.     Crow Huerta, OD    The affects of the dilating drops last for 4- 6 hours.  You will be more sensitive to light and vision will be blurry up close.  Do not drive if you do not feel comfortable.  Mydriatic sunglasses were given if needed.      Optometry Providers       Clinic Locations                                 Telephone Number   Dr. Shelly Miranda [facility]    [facility] [phone]     [facility] Hours:                [facility] Hours:       [facility] Hours:   [address]   [address]     [address] [place] [address]   [place] [address]    [place] [address]  Phone: [phone]                    Phone: [phone]     Phone: [phone]                      Monday 8:00-6:00                          Monday 8:00-6:00 Monday 8:00-6:00              Tuesday 8:00-6:00                          Tuesday 8:00-6:00                          Tuesday 8:00-6:00              Wednesday 8:00-6:00                  Wednesday 8:00-6:00                   Wednesday 8:00-6:00      Thursday 8:00-6:00                        Thursday  8:00-6:00                         Thursday 8:00-6:00            Friday 8:00-5:00                              Friday 8:00-5:00                              Friday 8:00-5:00    [facility] Hours:   [address] [place] [address]  [phone]    Monday 9:00-6:00  Tuesday 9:00-6:00  Wednesday 9:00-6:00  Thursday 9:00-6:00  Friday 9:00-5:00  As always, Thank you for trusting us with your health care needs!

## 2025-07-02 NOTE — PROGRESS NOTES
Chief Complaint   Patient presents with    Annual Eye Exam      Accompanied by wife   Last Eye Exam: 8-1-2024  Dilated Previously: Yes    What are you currently using to see?  glasses       Distance Vision Acuity: Satisfied with vision    Near Vision Acuity: Satisfied with vision while reading  with glasses    Eye Comfort: good, itches when in [place]  Do you use eye drops? : Yes: otc pataday?  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  IOPs have run within normal limits past eye exams     Family history of macular degeneration- mother       Cherie Mederos Optometric Assistant, A.B.O.C.          Medical, surgical and family histories reviewed and updated 7/2/2025.       OBJECTIVE: See Ophthalmology exam    ASSESSMENT:    ICD-10-CM    1. Examination of eyes and vision  Z01.00       2. Pseudophakia of both eyes  Z96.1       3. Regular astigmatism of both eyes  H52.223       4. Presbyopia  H52.4       5. Family history of macular degeneration  Z83.518       6. Allergic conjunctivitis of both eyes  H10.13           PLAN:     Patient Instructions   Eyeglass prescription given.  No change in eyeglass prescription.     Good nutrition is recommended.  Kale, spinach, broccoli, squash and other vegetables have high levels of antioxidants, including lutein and zeaxanthin which is important if you have macular degeneration.   Foods containing high levels of zinc are also important.  These include foods high in protein such as beef, pork and lamb. Nonmeat sources include yogurt, milk, cheese, whole-grain cereals and whole wheat bread.    OTC Pataday or Lastacaft to be used once daily for itchy eyes.  Use as needed.     Return in 1 year for a complete eye exam or sooner if needed.     Crow Huerta, OD

## 2025-07-02 NOTE — LETTER
7/2/2025      Jhonatan Crocker  [address]  [place] [address]      Dear Colleague,    Thank you for referring your patient, Jhonatan Crocker, to the [facility]. Please see a copy of my visit note below.    Chief Complaint   Patient presents with     Annual Eye Exam      Accompanied by wife   Last Eye Exam: 8-1-2024  Dilated Previously: Yes    What are you currently using to see?  glasses       Distance Vision Acuity: Satisfied with vision    Near Vision Acuity: Satisfied with vision while reading  with glasses    Eye Comfort: good, itches when in [place]  Do you use eye drops? : Yes: otc pataday?  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  IOPs have run within normal limits past eye exams     Family history of macular degeneration- mother       Cherie Mederos Optometric Assistant, A.B.O.C.          Medical, surgical and family histories reviewed and updated 7/2/2025.       OBJECTIVE: See Ophthalmology exam    ASSESSMENT:    ICD-10-CM    1. Examination of eyes and vision  Z01.00       2. Pseudophakia of both eyes  Z96.1       3. Regular astigmatism of both eyes  H52.223       4. Presbyopia  H52.4       5. Family history of macular degeneration  Z83.518       6. Allergic conjunctivitis of both eyes  H10.13           PLAN:     Patient Instructions   Eyeglass prescription given.  No change in eyeglass prescription.     Good nutrition is recommended.  Kale, spinach, broccoli, squash and other vegetables have high levels of antioxidants, including lutein and zeaxanthin which is important if you have macular degeneration.   Foods containing high levels of zinc are also important.  These include foods high in protein such as beef, pork and lamb. Nonmeat sources include yogurt,  milk, cheese, whole-grain cereals and whole wheat bread.    OTC Pataday or Lastacaft to be used once daily for itchy eyes.  Use as needed.     Return in 1 year for a complete eye exam or sooner if needed.     Crow Huerta, OD       Again, thank you for allowing me to participate in the care of your patient.        Sincerely,        Crow Huerta, OD    Electronically signed

## (undated) DEVICE — SOL WATER IRRIG 1000ML BOTTLE 07139-09

## (undated) DEVICE — EYE PACK CUSTOM CATARACT AS12127-01

## (undated) DEVICE — GLOVE PROTEXIS W/NEU-THERA 8.0  2D73TE80

## (undated) RX ORDER — MOXIFLOXACIN 5 MG/ML
SOLUTION/ DROPS OPHTHALMIC
Status: DISPENSED
Start: 2018-09-27

## (undated) RX ORDER — LIDOCAINE HYDROCHLORIDE 20 MG/ML
INJECTION, SOLUTION EPIDURAL; INFILTRATION; INTRACAUDAL; PERINEURAL
Status: DISPENSED
Start: 2018-09-27

## (undated) RX ORDER — EPINEPHRINE 1 MG/ML
INJECTION, SOLUTION INTRAMUSCULAR; SUBCUTANEOUS
Status: DISPENSED
Start: 2018-09-27

## (undated) RX ORDER — LIDOCAINE HYDROCHLORIDE 10 MG/ML
INJECTION, SOLUTION EPIDURAL; INFILTRATION; INTRACAUDAL; PERINEURAL
Status: DISPENSED
Start: 2018-09-27

## (undated) RX ORDER — PROPOFOL 10 MG/ML
INJECTION, EMULSION INTRAVENOUS
Status: DISPENSED
Start: 2018-09-27

## (undated) RX ORDER — ACETAMINOPHEN 325 MG/1
TABLET ORAL
Status: DISPENSED
Start: 2018-09-27

## (undated) RX ORDER — FENTANYL CITRATE 50 UG/ML
INJECTION, SOLUTION INTRAMUSCULAR; INTRAVENOUS
Status: DISPENSED
Start: 2018-09-27

## (undated) RX ORDER — ACETAMINOPHEN 325 MG/1
TABLET ORAL
Status: DISPENSED
Start: 2018-10-11

## (undated) RX ORDER — TETRACAINE HYDROCHLORIDE 5 MG/ML
SOLUTION OPHTHALMIC
Status: DISPENSED
Start: 2018-09-27